# Patient Record
Sex: FEMALE | Employment: OTHER | URBAN - NONMETROPOLITAN AREA
[De-identification: names, ages, dates, MRNs, and addresses within clinical notes are randomized per-mention and may not be internally consistent; named-entity substitution may affect disease eponyms.]

---

## 2021-04-06 ENCOUNTER — DOCTOR'S OFFICE (OUTPATIENT)
Dept: URBAN - NONMETROPOLITAN AREA CLINIC 1 | Facility: CLINIC | Age: 56
Setting detail: OPHTHALMOLOGY
End: 2021-04-06
Payer: COMMERCIAL

## 2021-04-06 DIAGNOSIS — H52.4: ICD-10-CM

## 2021-04-06 DIAGNOSIS — H52.03: ICD-10-CM

## 2021-04-06 PROCEDURE — 92310 CONTACT LENS FITTING OU: CPT | Performed by: OPTOMETRIST

## 2021-04-06 PROCEDURE — 92015 DETERMINE REFRACTIVE STATE: CPT | Performed by: OPTOMETRIST

## 2021-04-06 PROCEDURE — 92004 COMPRE OPH EXAM NEW PT 1/>: CPT | Performed by: OPTOMETRIST

## 2021-04-06 ASSESSMENT — REFRACTION_MANIFEST
OD_CYLINDER: -0.25
OS_SPHERE: +5.00
OS_VA1: 20/20-2
OD_SPHERE: +3.00
OD_VA1: 20/20-2
OS_AXIS: 060
OS_VA2: 20/20-2
OS_CYLINDER: -0.75
OD_ADD: +2.50
OS_ADD: +2.50
OD_VA2: 20/20-2
OD_AXIS: 090

## 2021-04-06 ASSESSMENT — REFRACTION_AUTOREFRACTION
OD_AXIS: 170
OS_SPHERE: +5.25
OS_CYLINDER: -0.75
OD_CYLINDER: -0.25
OD_SPHERE: +3.25
OS_AXIS: 54

## 2021-04-06 ASSESSMENT — VISUAL ACUITY
OD_BCVA: 20/20-2
OS_BCVA: 20/20-2

## 2021-04-06 ASSESSMENT — REFRACTION_CURRENTRX
OS_ADD: +2.00
OD_SPHERE: +2.50
OD_CYLINDER: -0.50
OS_OVR_VA: 20/
OD_OVR_VA: 20/
OD_ADD: +2.00
OS_CYLINDER: -1.00
OD_AXIS: 87
OS_AXIS: 75
OS_SPHERE: +5.00

## 2021-04-06 ASSESSMENT — CONFRONTATIONAL VISUAL FIELD TEST (CVF)
OS_FINDINGS: FULL
OD_FINDINGS: FULL

## 2021-04-06 ASSESSMENT — SPHEQUIV_DERIVED
OS_SPHEQUIV: 4.625
OD_SPHEQUIV: 2.875
OD_SPHEQUIV: 3.125
OS_SPHEQUIV: 4.875

## 2021-04-06 ASSESSMENT — TONOMETRY
OS_IOP_MMHG: 12
OD_IOP_MMHG: 12

## 2021-05-14 ENCOUNTER — DOCTOR'S OFFICE (OUTPATIENT)
Dept: URBAN - NONMETROPOLITAN AREA CLINIC 1 | Facility: CLINIC | Age: 56
Setting detail: OPHTHALMOLOGY
End: 2021-05-14
Payer: MEDICARE

## 2021-05-14 DIAGNOSIS — D23.112: ICD-10-CM

## 2021-05-14 PROCEDURE — 92285 EXTERNAL OCULAR PHOTOGRAPHY: CPT | Performed by: OPHTHALMOLOGY

## 2021-05-14 ASSESSMENT — CONFRONTATIONAL VISUAL FIELD TEST (CVF)
OD_FINDINGS: FULL
OS_FINDINGS: FULL

## 2021-05-17 ASSESSMENT — REFRACTION_MANIFEST
OD_VA1: 20/20-2
OS_SPHERE: +5.00
OS_VA2: 20/20-2
OS_CYLINDER: -0.75
OS_AXIS: 060
OD_CYLINDER: -0.25
OD_SPHERE: +3.00
OD_VA2: 20/20-2
OD_ADD: +2.50
OS_ADD: +2.50
OS_VA1: 20/20-2
OD_AXIS: 090

## 2021-05-17 ASSESSMENT — REFRACTION_AUTOREFRACTION
OS_SPHERE: +5.25
OD_SPHERE: +3.25
OS_AXIS: 54
OD_CYLINDER: -0.25
OD_AXIS: 170
OS_CYLINDER: -0.75

## 2021-05-17 ASSESSMENT — REFRACTION_CURRENTRX
OD_ADD: +2.00
OD_OVR_VA: 20/
OS_OVR_VA: 20/
OS_AXIS: 75
OD_SPHERE: +2.50
OS_CYLINDER: -1.00
OD_AXIS: 87
OD_CYLINDER: -0.50
OS_ADD: +2.00
OS_SPHERE: +5.00

## 2021-05-17 ASSESSMENT — VISUAL ACUITY
OD_BCVA: 20/20-2
OS_BCVA: 20/20-2

## 2021-05-17 ASSESSMENT — SPHEQUIV_DERIVED
OS_SPHEQUIV: 4.625
OS_SPHEQUIV: 4.875
OD_SPHEQUIV: 2.875
OD_SPHEQUIV: 3.125

## 2021-06-30 ENCOUNTER — DOCTOR'S OFFICE (OUTPATIENT)
Dept: URBAN - NONMETROPOLITAN AREA CLINIC 1 | Facility: CLINIC | Age: 56
Setting detail: OPHTHALMOLOGY
End: 2021-06-30

## 2021-06-30 DIAGNOSIS — H52.03: ICD-10-CM

## 2021-06-30 PROCEDURE — CLFUP CONTACT LENS FOLLOW-UP: Performed by: OPTOMETRIST

## 2021-06-30 ASSESSMENT — REFRACTION_MANIFEST
OS_CYLINDER: -0.75
OD_VA2: 20/20-2
OS_ADD: +2.50
OD_VA1: 20/20-2
OS_VA2: 20/20-2
OS_AXIS: 060
OS_SPHERE: +5.00
OD_ADD: +2.50
OD_AXIS: 090
OD_CYLINDER: -0.25
OD_SPHERE: +3.00
OS_VA1: 20/20-2

## 2021-06-30 ASSESSMENT — CONFRONTATIONAL VISUAL FIELD TEST (CVF)
OS_FINDINGS: FULL
OD_FINDINGS: FULL

## 2021-06-30 ASSESSMENT — REFRACTION_CURRENTRX
OS_AXIS: 75
OD_ADD: +2.00
OS_CYLINDER: -1.00
OD_AXIS: 87
OS_SPHERE: +5.00
OS_ADD: +2.00
OD_CYLINDER: -0.50
OS_OVR_VA: 20/
OD_OVR_VA: 20/
OD_SPHERE: +2.50

## 2021-06-30 ASSESSMENT — VISUAL ACUITY
OD_BCVA: 20/30-2
OS_BCVA: 20/25-1

## 2021-06-30 ASSESSMENT — SPHEQUIV_DERIVED
OD_SPHEQUIV: 2.875
OD_SPHEQUIV: 3.125
OS_SPHEQUIV: 4.875
OS_SPHEQUIV: 4.625

## 2021-06-30 ASSESSMENT — REFRACTION_AUTOREFRACTION
OS_SPHERE: +5.25
OS_AXIS: 54
OS_CYLINDER: -0.75
OD_CYLINDER: -0.25
OD_SPHERE: +3.25
OD_AXIS: 170

## 2021-08-17 ENCOUNTER — AMBUL SURGICAL CARE (OUTPATIENT)
Dept: URBAN - NONMETROPOLITAN AREA SURGERY 1 | Facility: SURGERY | Age: 56
Setting detail: OPHTHALMOLOGY
End: 2021-08-17
Payer: MEDICARE

## 2021-08-17 DIAGNOSIS — D23.112: ICD-10-CM

## 2021-08-17 PROCEDURE — G8907 PT DOC NO EVENTS ON DISCHARG: HCPCS | Performed by: OPHTHALMOLOGY

## 2021-08-17 PROCEDURE — G8918 PT W/O PREOP ORDER IV AB PRO: HCPCS | Performed by: OPHTHALMOLOGY

## 2021-08-17 PROCEDURE — 67810 INCAL BX EYELID SKN LID MRGN: CPT | Performed by: OPHTHALMOLOGY

## 2021-08-17 PROCEDURE — 67840 REMOVE EYELID LESION: CPT | Performed by: OPHTHALMOLOGY

## 2021-08-27 ENCOUNTER — DOCTOR'S OFFICE (OUTPATIENT)
Dept: URBAN - NONMETROPOLITAN AREA CLINIC 1 | Facility: CLINIC | Age: 56
Setting detail: OPHTHALMOLOGY
End: 2021-08-27
Payer: MEDICARE

## 2021-08-27 ENCOUNTER — RX ONLY (RX ONLY)
Age: 56
End: 2021-08-27

## 2021-08-27 DIAGNOSIS — D23.112: ICD-10-CM

## 2021-08-27 PROBLEM — H40.033 ANATOMICAL NARROW ANGLE; BOTH EYES: Status: ACTIVE | Noted: 2021-04-06

## 2021-08-27 PROBLEM — H25.013 CORTICAL CATARACT; BOTH EYES: Status: ACTIVE | Noted: 2021-04-06

## 2021-08-27 PROBLEM — H52.03 HYPEROPIA; BOTH EYES: Status: ACTIVE | Noted: 2021-04-06

## 2021-08-27 PROCEDURE — 92012 INTRM OPH EXAM EST PATIENT: CPT | Performed by: OPHTHALMOLOGY

## 2021-08-27 ASSESSMENT — SPHEQUIV_DERIVED
OS_SPHEQUIV: 4.875
OD_SPHEQUIV: 2.875
OD_SPHEQUIV: 3.125
OS_SPHEQUIV: 4.625

## 2021-08-27 ASSESSMENT — REFRACTION_MANIFEST
OD_SPHERE: +3.00
OS_VA2: 20/20-2
OD_CYLINDER: -0.25
OD_AXIS: 090
OD_VA2: 20/20-2
OS_SPHERE: +5.00
OS_CYLINDER: -0.75
OS_AXIS: 060
OS_VA1: 20/20-2
OD_VA1: 20/20-2
OD_ADD: +2.50
OS_ADD: +2.50

## 2021-08-27 ASSESSMENT — REFRACTION_AUTOREFRACTION
OD_SPHERE: +3.25
OS_SPHERE: +5.25
OS_CYLINDER: -0.75
OD_AXIS: 170
OS_AXIS: 54
OD_CYLINDER: -0.25

## 2021-08-27 ASSESSMENT — REFRACTION_CURRENTRX
OD_ADD: +2.00
OD_CYLINDER: -0.50
OS_CYLINDER: -1.00
OS_ADD: +2.00
OD_AXIS: 87
OS_OVR_VA: 20/
OS_SPHERE: +5.00
OS_AXIS: 75
OD_OVR_VA: 20/
OD_SPHERE: +2.50

## 2021-08-27 ASSESSMENT — CONFRONTATIONAL VISUAL FIELD TEST (CVF)
OS_FINDINGS: FULL
OD_FINDINGS: FULL

## 2021-08-27 ASSESSMENT — VISUAL ACUITY
OD_BCVA: 20/30-2
OS_BCVA: 20/25-2

## 2023-02-24 ENCOUNTER — OFFICE VISIT (OUTPATIENT)
Dept: URGENT CARE | Facility: CLINIC | Age: 58
End: 2023-02-24

## 2023-02-24 VITALS
DIASTOLIC BLOOD PRESSURE: 76 MMHG | SYSTOLIC BLOOD PRESSURE: 130 MMHG | HEIGHT: 62 IN | WEIGHT: 165 LBS | OXYGEN SATURATION: 97 % | HEART RATE: 90 BPM | RESPIRATION RATE: 18 BRPM | TEMPERATURE: 96.8 F | BODY MASS INDEX: 30.36 KG/M2

## 2023-02-24 DIAGNOSIS — J01.00 ACUTE NON-RECURRENT MAXILLARY SINUSITIS: Primary | ICD-10-CM

## 2023-02-24 LAB — S PYO AG THROAT QL: NEGATIVE

## 2023-02-24 RX ORDER — ATORVASTATIN CALCIUM 40 MG/1
40 TABLET, FILM COATED ORAL DAILY
COMMUNITY

## 2023-02-24 RX ORDER — BENZONATATE 100 MG/1
100 CAPSULE ORAL 3 TIMES DAILY PRN
Qty: 20 CAPSULE | Refills: 0 | Status: SHIPPED | OUTPATIENT
Start: 2023-02-24

## 2023-02-24 RX ORDER — PANTOPRAZOLE SODIUM 40 MG/1
40 TABLET, DELAYED RELEASE ORAL
COMMUNITY

## 2023-02-24 RX ORDER — RALTEGRAVIR 400 MG/1
400 TABLET, FILM COATED ORAL 2 TIMES DAILY
COMMUNITY
Start: 2023-01-30

## 2023-02-24 RX ORDER — PREGABALIN 75 MG/1
75 CAPSULE ORAL 2 TIMES DAILY
COMMUNITY

## 2023-02-24 RX ORDER — TRAZODONE HYDROCHLORIDE 100 MG/1
100 TABLET ORAL
COMMUNITY
Start: 2022-12-28

## 2023-02-24 RX ORDER — AMOXICILLIN 500 MG/1
500 TABLET, FILM COATED ORAL 2 TIMES DAILY
Qty: 14 TABLET | Refills: 0 | Status: SHIPPED | OUTPATIENT
Start: 2023-02-24 | End: 2023-03-03

## 2023-02-24 RX ORDER — EMTRICITABINE AND TENOFOVIR ALAFENAMIDE 200; 25 MG/1; MG/1
1 TABLET ORAL DAILY
COMMUNITY
Start: 2023-01-30

## 2023-02-24 NOTE — PROGRESS NOTES
[facility]        NAME: Renetta Coronado is a 61 y o  female  : 1965    MRN: [id number]  DATE: 2023  TIME: 5:20 PM    Assessment and Plan   Acute non-recurrent maxillary sinusitis [J01 00]  1  Acute non-recurrent maxillary sinusitis  POCT rapid strepA    amoxicillin (AMOXIL) 500 MG tablet    benzonatate (TESSALON PERLES) 100 mg capsule        Discussed problem with patient  Rapid strep performed today was negative but prescribing amoxicillin for sinusitis complaints as well as Tessalon Perles for cough symptoms  Advised continue conservative management by using over-the-counter cold and flu medication and advised to keep pushing fluids  Follow-up with PCP if symptoms do not improve and report to the ER symptoms worsen  Patient Instructions       Follow up with PCP in 3-5 days  Proceed to  ER if symptoms worsen  Chief Complaint     Chief Complaint   Patient presents with   • Cold Like Symptoms     C/o nasal congestion, sore throat, onset 10 days ago  History of Present Illness       71-year-old female presents with 10 days of sinus pressure, congestion, cough  Symptoms began gradually and she has been using over-the-counter cold and flu medication which is helped somewhat but after the medication wears off her symptoms returned  Appetite is decreased but has been pushing fluids  Denies any fevers or chills  Cough is nonproductive but has been keeping her up at night and it is worse in the morning  Also been having headaches but she relates due to sinus pressure  Denies any shortness of breath, chest pain, abdominal complaints  Review of Systems   Review of Systems   Constitutional: Positive for appetite change  Negative for chills, fatigue and fever  HENT: Positive for congestion, postnasal drip, rhinorrhea, sinus pressure and sore throat  Negative for ear pain and sinus pain  Respiratory: Positive for cough   Negative for shortness of breath, wheezing and stridor  Cardiovascular: Negative for chest pain and palpitations  Gastrointestinal: Negative for abdominal pain, constipation, diarrhea, nausea and vomiting  Musculoskeletal: Negative for myalgias  Neurological: Positive for headaches  Negative for dizziness, syncope and light-headedness  Current Medications       Current Outpatient Medications:   •  amoxicillin (AMOXIL) 500 MG tablet, Take 1 tablet (500 mg total) by mouth 2 (two) times a day for 7 days, Disp: 14 tablet, Rfl: 0  •  atorvastatin (LIPITOR) 40 mg tablet, Take 40 mg by mouth in the morning, Disp: , Rfl:   •  benzonatate (TESSALON PERLES) 100 mg capsule, Take 1 capsule (100 mg total) by mouth 3 (three) times a day as needed for cough, Disp: 20 capsule, Rfl: 0  •  Descovy 200-25 MG tablet, Take 1 tablet by mouth daily, Disp: , Rfl:   •  Isentress 400 MG tablet, Take 400 mg by mouth 2 (two) times a day, Disp: , Rfl:   •  pantoprazole (PROTONIX) 40 mg tablet, Take 40 mg by mouth daily at bedtime, Disp: , Rfl:   •  pregabalin (LYRICA) 75 mg capsule, Take 75 mg by mouth 2 (two) times a day, Disp: , Rfl:   •  traZODone (DESYREL) 100 mg tablet, Take 100 mg by mouth daily at bedtime, Disp: , Rfl:     Current Allergies     Allergies as of 02/24/2023   • (No Known Allergies)            The following portions of the patient's history were reviewed and updated as appropriate: allergies, current medications, past family history, past medical history, past social history, past surgical history and problem list      Past Medical History:   Diagnosis Date   • GERD (gastroesophageal reflux disease)    • High cholesterol    • HIV carrier [facility])        Past Surgical History:   Procedure Laterality Date   • HYSTERECTOMY     • TUBAL LIGATION         Family History   Problem Relation Age of Onset   • Heart disease Mother    • Hypertension Father          Medications have been verified          Objective   /76   Pulse 90   Temp (!) 96 8 °F (36 °C) Resp 18   Ht 5' 2" (1 575 m)   Wt 74 8 kg (165 lb)   SpO2 97%   BMI 30 18 kg/m²        Physical Exam     Physical Exam  Vitals and nursing note reviewed  Constitutional:       General: She is not in acute distress  Appearance: Normal appearance  She is normal weight  She is not ill-appearing, toxic-appearing or diaphoretic  HENT:      Head: Normocephalic  Right Ear: Tympanic membrane, ear canal and external ear normal  There is no impacted cerumen  Left Ear: Tympanic membrane, ear canal and external ear normal  There is no impacted cerumen  Nose: Congestion and rhinorrhea present  Mouth/Throat:      Mouth: Mucous membranes are moist       Pharynx: Oropharynx is clear  Posterior oropharyngeal erythema present  No oropharyngeal exudate  Eyes:      General:         Right eye: No discharge  Left eye: No discharge  Extraocular Movements: Extraocular movements intact  Conjunctiva/sclera: Conjunctivae normal       Pupils: Pupils are equal, round, and reactive to light  Neck:      Vascular: No carotid bruit  Cardiovascular:      Rate and Rhythm: Normal rate and regular rhythm  Pulses: Normal pulses  Heart sounds: Normal heart sounds  No murmur heard  No friction rub  No gallop  Pulmonary:      Effort: Pulmonary effort is normal  No respiratory distress  Breath sounds: Normal breath sounds  No stridor  No wheezing, rhonchi or rales  Chest:      Chest wall: No tenderness  Musculoskeletal:         General: No swelling, tenderness or signs of injury  Normal range of motion  Cervical back: Normal range of motion and neck supple  No rigidity or tenderness  Lymphadenopathy:      Cervical: No cervical adenopathy  Skin:     General: Skin is warm and dry  Capillary Refill: Capillary refill takes less than 2 seconds  Coloration: Skin is not jaundiced or pale  Findings: No erythema     Neurological:      General: No focal deficit present  Mental Status: She is alert and oriented to person, place, and time     Psychiatric:         Mood and Affect: Mood normal          Behavior: Behavior normal

## 2023-05-09 ENCOUNTER — DOCTOR'S OFFICE (OUTPATIENT)
Dept: URBAN - NONMETROPOLITAN AREA CLINIC 1 | Facility: CLINIC | Age: 58
Setting detail: OPHTHALMOLOGY
End: 2023-05-09
Payer: COMMERCIAL

## 2023-05-09 ENCOUNTER — OPTICAL OFFICE (OUTPATIENT)
Dept: URBAN - NONMETROPOLITAN AREA CLINIC 4 | Facility: CLINIC | Age: 58
Setting detail: OPHTHALMOLOGY
End: 2023-05-09
Payer: COMMERCIAL

## 2023-05-09 DIAGNOSIS — H52.4: ICD-10-CM

## 2023-05-09 PROBLEM — H25.13 CATARACT NUCLEAR SCLEROSIS; BOTH EYES: Status: ACTIVE | Noted: 2023-05-09

## 2023-05-09 PROCEDURE — 92014 COMPRE OPH EXAM EST PT 1/>: CPT | Performed by: OPTOMETRIST

## 2023-05-09 PROCEDURE — V2744 TINT PHOTOCHROMATIC LENS/ES: HCPCS | Performed by: OPTOMETRIST

## 2023-05-09 PROCEDURE — V2781 PROGRESSIVE LENS PER LENS: HCPCS | Performed by: OPTOMETRIST

## 2023-05-09 PROCEDURE — V2784 LENS POLYCARB OR EQUAL: HCPCS | Performed by: OPTOMETRIST

## 2023-05-09 PROCEDURE — V2750 ANTI-REFLECTIVE COATING: HCPCS | Performed by: OPTOMETRIST

## 2023-05-09 PROCEDURE — V2020 VISION SVCS FRAMES PURCHASES: HCPCS | Performed by: OPTOMETRIST

## 2023-05-09 ASSESSMENT — VISUAL ACUITY
OS_BCVA: 20/20-2
OD_BCVA: 20/20-1

## 2023-05-09 ASSESSMENT — REFRACTION_MANIFEST
OS_ADD: +2.50
OD_VA1: 20/20-2
OD_VA2: 20/20-2
OD_AXIS: 090
OS_VA2: 20/20-2
OD_SPHERE: +3.00
OS_SPHERE: +5.00
OS_VA1: 20/20-2
OS_AXIS: 060
OD_ADD: +2.50
OD_CYLINDER: -0.25
OS_CYLINDER: -0.75

## 2023-05-09 ASSESSMENT — REFRACTION_CURRENTRX
OS_OVR_VA: 20/
OS_AXIS: 084
OD_AXIS: 089
OD_CYLINDER: -0.50
OS_CYLINDER: -1.00
OS_VPRISM_DIRECTION: BF
OS_ADD: +2.25
OD_VPRISM_DIRECTION: BF
OS_SPHERE: +5.00
OD_ADD: +2.25
OD_SPHERE: +2.50
OD_OVR_VA: 20/

## 2023-05-09 ASSESSMENT — SPHEQUIV_DERIVED
OS_SPHEQUIV: 4.75
OD_SPHEQUIV: 2.875
OS_SPHEQUIV: 4.625
OD_SPHEQUIV: 2.875

## 2023-05-09 ASSESSMENT — REFRACTION_AUTOREFRACTION
OD_SPHERE: +3.00
OD_AXIS: 108
OD_CYLINDER: -0.25
OS_SPHERE: +5.25
OS_CYLINDER: -1.00
OS_AXIS: 076

## 2023-05-09 ASSESSMENT — TONOMETRY
OS_IOP_MMHG: 16
OD_IOP_MMHG: 16

## 2023-05-09 ASSESSMENT — CONFRONTATIONAL VISUAL FIELD TEST (CVF)
OS_FINDINGS: FULL
OD_FINDINGS: FULL

## 2023-05-25 ENCOUNTER — OFFICE VISIT (OUTPATIENT)
Dept: URGENT CARE | Facility: CLINIC | Age: 58
End: 2023-05-25

## 2023-05-25 VITALS
DIASTOLIC BLOOD PRESSURE: 78 MMHG | HEIGHT: 62 IN | HEART RATE: 81 BPM | WEIGHT: 167 LBS | RESPIRATION RATE: 18 BRPM | TEMPERATURE: 97.7 F | OXYGEN SATURATION: 96 % | SYSTOLIC BLOOD PRESSURE: 134 MMHG | BODY MASS INDEX: 30.73 KG/M2

## 2023-05-25 DIAGNOSIS — J20.9 ACUTE BRONCHITIS, UNSPECIFIED ORGANISM: Primary | ICD-10-CM

## 2023-05-25 RX ORDER — BENZONATATE 100 MG/1
100 CAPSULE ORAL 3 TIMES DAILY PRN
Qty: 20 CAPSULE | Refills: 0 | Status: SHIPPED | OUTPATIENT
Start: 2023-05-25

## 2023-05-25 RX ORDER — AZITHROMYCIN 250 MG/1
TABLET, FILM COATED ORAL
Qty: 6 TABLET | Refills: 0 | Status: SHIPPED | OUTPATIENT
Start: 2023-05-25 | End: 2023-05-29

## 2023-05-25 NOTE — PATIENT INSTRUCTIONS
Take antibiotics as prescribed  Take Tessalon as needed for cough  Can use OTC Mucinex/sudafed as needed  Tylenol/ibuprofen for pain or fever   Honey for cough  Cool mist humidifier or steamy shower using safe precautions to prevent burns   Follow up with PCP in 3-5 days  Proceed to  ER if symptoms worsen

## 2023-05-25 NOTE — PROGRESS NOTES
[facility]        NAME: Mariya Mccray is a 60 y o  female  : 1965    MRN: [id number]  DATE: May 25, 2023  TIME: 7:16 PM    Assessment and Plan   Acute bronchitis, unspecified organism [J20 9]  1  Acute bronchitis, unspecified organism  benzonatate (TESSALON PERLES) 100 mg capsule    azithromycin (ZITHROMAX) 250 mg tablet            Patient Instructions     Take antibiotics as prescribed  Take Tessalon as needed for cough  Can use OTC Mucinex/sudafed as needed  Tylenol/ibuprofen for pain or fever   Honey for cough  Cool mist humidifier or steamy shower using safe precautions to prevent burns   Follow up with PCP in 3-5 days  Proceed to  ER if symptoms worsen  Chief Complaint     Chief Complaint   Patient presents with   • Cough     C/o productive cough with clear mucus, Onset of symptoms x7 days  Pt states she has tried otc medication, vicks and a humidifier but cough is progressively getting worse  History of Present Illness       Cough  This is a new problem  Episode onset: 7 days  The problem has been gradually worsening  The cough is productive of sputum  Associated symptoms include wheezing  Pertinent negatives include no chest pain, chills, ear pain, fever, postnasal drip, rhinorrhea, sore throat or shortness of breath  Associated symptoms comments: Has metal taste in mouth  Treatments tried: OTC medications, Vicks, humidifier  The treatment provided no relief  There is no history of asthma  Review of Systems   Review of Systems   Constitutional: Negative for chills, diaphoresis, fatigue and fever  HENT: Negative for congestion, ear pain, postnasal drip, rhinorrhea, sinus pressure, sore throat and trouble swallowing  Respiratory: Positive for cough and wheezing  Negative for chest tightness and shortness of breath  Cardiovascular: Negative for chest pain and palpitations  Skin: Negative for color change  Neurological: Negative for dizziness and light-headedness  "  Psychiatric/Behavioral: Negative for sleep disturbance  Current Medications       Current Outpatient Medications:   •  atorvastatin (LIPITOR) 40 mg tablet, Take 40 mg by mouth in the morning, Disp: , Rfl:   •  azithromycin (ZITHROMAX) 250 mg tablet, Take 2 tablets today then 1 tablet daily x 4 days, Disp: 6 tablet, Rfl: 0  •  benzonatate (TESSALON PERLES) 100 mg capsule, Take 1 capsule (100 mg total) by mouth 3 (three) times a day as needed for cough, Disp: 20 capsule, Rfl: 0  •  Descovy 200-25 MG tablet, Take 1 tablet by mouth daily, Disp: , Rfl:   •  Isentress 400 MG tablet, Take 400 mg by mouth 2 (two) times a day, Disp: , Rfl:   •  pantoprazole (PROTONIX) 40 mg tablet, Take 40 mg by mouth daily at bedtime, Disp: , Rfl:   •  pregabalin (LYRICA) 75 mg capsule, Take 75 mg by mouth 2 (two) times a day, Disp: , Rfl:   •  traZODone (DESYREL) 100 mg tablet, Take 100 mg by mouth daily at bedtime, Disp: , Rfl:     Current Allergies     Allergies as of 05/25/2023   • (No Known Allergies)            The following portions of the patient's history were reviewed and updated as appropriate: allergies, current medications, past family history, past medical history, past social history, past surgical history and problem list      Past Medical History:   Diagnosis Date   • GERD (gastroesophageal reflux disease)    • High cholesterol    • HIV carrier [facility])        Past Surgical History:   Procedure Laterality Date   • HYSTERECTOMY     • TUBAL LIGATION         Family History   Problem Relation Age of Onset   • Heart disease Mother    • Hypertension Father          Medications have been verified  Objective   /78   Pulse 81   Temp 97 7 °F (36 5 °C)   Resp 18   Ht 5' 2\" (1 575 m)   Wt 75 8 kg (167 lb)   SpO2 96%   BMI 30 54 kg/m²        Physical Exam     Physical Exam  Constitutional:       General: She is not in acute distress  Appearance: Normal appearance  She is not ill-appearing     HENT:      " Head: Normocephalic  Right Ear: Tympanic membrane and external ear normal       Left Ear: Tympanic membrane and external ear normal       Nose: No congestion  Mouth/Throat:      Mouth: Mucous membranes are moist       Pharynx: Oropharynx is clear  No oropharyngeal exudate or posterior oropharyngeal erythema  Cardiovascular:      Rate and Rhythm: Normal rate and regular rhythm  Pulses: Normal pulses  Heart sounds: Normal heart sounds  Pulmonary:      Effort: Pulmonary effort is normal  No respiratory distress  Breath sounds: Normal breath sounds  No stridor  No wheezing, rhonchi or rales  Lymphadenopathy:      Cervical: No cervical adenopathy  Skin:     General: Skin is warm and dry  Neurological:      General: No focal deficit present  Mental Status: She is alert and oriented to person, place, and time  Mental status is at baseline  Psychiatric:         Mood and Affect: Mood normal          Behavior: Behavior normal          Thought Content:  Thought content normal          Judgment: Judgment normal

## 2024-03-30 ENCOUNTER — OFFICE VISIT (OUTPATIENT)
Dept: URGENT CARE | Facility: CLINIC | Age: 59
End: 2024-03-30
Payer: COMMERCIAL

## 2024-03-30 VITALS
BODY MASS INDEX: 30.12 KG/M2 | HEIGHT: 63 IN | WEIGHT: 170 LBS | HEART RATE: 98 BPM | RESPIRATION RATE: 20 BRPM | OXYGEN SATURATION: 97 % | DIASTOLIC BLOOD PRESSURE: 70 MMHG | SYSTOLIC BLOOD PRESSURE: 110 MMHG | TEMPERATURE: 97.8 F

## 2024-03-30 DIAGNOSIS — J01.10 ACUTE NON-RECURRENT FRONTAL SINUSITIS: Primary | ICD-10-CM

## 2024-03-30 PROCEDURE — 99213 OFFICE O/P EST LOW 20 MIN: CPT

## 2024-03-30 RX ORDER — FLUTICASONE PROPIONATE 50 MCG
1 SPRAY, SUSPENSION (ML) NASAL DAILY
Qty: 9.9 ML | Refills: 0 | Status: SHIPPED | OUTPATIENT
Start: 2024-03-30

## 2024-03-30 RX ORDER — AMOXICILLIN AND CLAVULANATE POTASSIUM 875; 125 MG/1; MG/1
1 TABLET, FILM COATED ORAL EVERY 12 HOURS SCHEDULED
Qty: 14 TABLET | Refills: 0 | Status: SHIPPED | OUTPATIENT
Start: 2024-03-30 | End: 2024-04-06

## 2024-03-30 NOTE — PROGRESS NOTES
[facility]        NAME: Margaret Shepard is a 58 y.o. female  : 1965    MRN: [id number]  DATE: 2024  TIME: 3:10 PM    Assessment and Plan   Acute non-recurrent frontal sinusitis [J01.10]  1. Acute non-recurrent frontal sinusitis  amoxicillin-clavulanate (AUGMENTIN) 875-125 mg per tablet    fluticasone (FLONASE) 50 mcg/act nasal spray        Patient Instructions     Antibiotics given for sinusitis. Recommend OTC decongestants.  Follow up with PCP in 3-5 days if no improvement. Proceed to ER if symptoms worsen.    Chief Complaint     Chief Complaint   Patient presents with    Cold Like Symptoms     C/o nasal congestion,, body aches, productive cough of brown mucus, intermittent headache. Onset 2 weeks. Home Covid test negative yesterday       History of Present Illness     Margaret Shepard is a 58 y.o. female presenting to the office today for facial pain and congestion.  Symptoms have been present for 14 days, and include postnasal drip, congestion, and brown mucus.   She has tried Nyquil, Dayquil, tea for her symptoms, no relief.  Sick contacts include: none  Recent travel: none    Review of Systems     Review of Systems   Constitutional:  Negative for chills and fever.   HENT:  Positive for congestion, sinus pressure and sore throat. Negative for ear pain and trouble swallowing.    Respiratory:  Positive for cough (dry). Negative for shortness of breath, wheezing and stridor.    Gastrointestinal:  Negative for abdominal pain, nausea and vomiting.   Genitourinary: Negative.    Musculoskeletal:  Positive for myalgias.   Neurological:  Positive for headaches.       Current Medications       Current Outpatient Medications:     amoxicillin-clavulanate (AUGMENTIN) 875-125 mg per tablet, Take 1 tablet by mouth every 12 (twelve) hours for 7 days, Disp: 14 tablet, Rfl: 0    atorvastatin (LIPITOR) 40 mg tablet, Take 40 mg by mouth in the morning, Disp: , Rfl:     Descovy 200-25 MG tablet, Take 1 tablet by  "mouth daily, Disp: , Rfl:     fluticasone (FLONASE) 50 mcg/act nasal spray, 1 spray into each nostril daily, Disp: 9.9 mL, Rfl: 0    Isentress 400 MG tablet, Take 400 mg by mouth 2 (two) times a day, Disp: , Rfl:     pantoprazole (PROTONIX) 40 mg tablet, Take 40 mg by mouth daily at bedtime, Disp: , Rfl:     pregabalin (LYRICA) 75 mg capsule, Take 75 mg by mouth 2 (two) times a day, Disp: , Rfl:     traZODone (DESYREL) 100 mg tablet, Take 100 mg by mouth daily at bedtime, Disp: , Rfl:     benzonatate (TESSALON PERLES) 100 mg capsule, Take 1 capsule (100 mg total) by mouth 3 (three) times a day as needed for cough (Patient not taking: Reported on 3/30/2024), Disp: 20 capsule, Rfl: 0    Current Allergies     Allergies as of 03/30/2024    (No Known Allergies)            The following portions of the patient's history were reviewed and updated as appropriate: allergies, current medications, past family history, past medical history, past social history, past surgical history and problem list.     Past Medical History:   Diagnosis Date    GERD (gastroesophageal reflux disease)     High cholesterol     HIV carrier (HCC)        Past Surgical History:   Procedure Laterality Date    HYSTERECTOMY      TUBAL LIGATION         Family History   Problem Relation Age of Onset    Heart disease Mother     Hypertension Father        Medications have been verified.    Objective     /70   Pulse 98   Temp 97.8 °F (36.6 °C)   Resp 20   Ht 5' 3\" (1.6 m)   Wt 77.1 kg (170 lb)   SpO2 97%   BMI 30.11 kg/m²   No LMP recorded. Patient is postmenopausal.     Physical Exam     Physical Exam  Vitals and nursing note reviewed.   Constitutional:       General: She is not in acute distress.     Appearance: Normal appearance. She is normal weight. She is not ill-appearing, toxic-appearing or diaphoretic.   HENT:      Head: Normocephalic and atraumatic.      Right Ear: Tympanic membrane, ear canal and external ear normal. There is no " impacted cerumen.      Left Ear: Tympanic membrane, ear canal and external ear normal. There is no impacted cerumen.      Nose: Congestion and rhinorrhea present.      Right Sinus: Frontal sinus tenderness present. No maxillary sinus tenderness.      Left Sinus: Frontal sinus tenderness present. No maxillary sinus tenderness.      Mouth/Throat:      Mouth: Mucous membranes are moist.      Pharynx: Posterior oropharyngeal erythema present. No oropharyngeal exudate.   Eyes:      General: No scleral icterus.        Right eye: No discharge.         Left eye: No discharge.      Conjunctiva/sclera: Conjunctivae normal.   Cardiovascular:      Rate and Rhythm: Normal rate and regular rhythm.      Pulses: Normal pulses.      Heart sounds: Normal heart sounds. No murmur heard.     No friction rub. No gallop.   Pulmonary:      Effort: Pulmonary effort is normal. No respiratory distress.      Breath sounds: Normal breath sounds. No stridor. No wheezing, rhonchi or rales.   Chest:      Chest wall: No tenderness.   Musculoskeletal:         General: Normal range of motion.      Cervical back: Normal range of motion and neck supple. No rigidity or tenderness.   Lymphadenopathy:      Cervical: No cervical adenopathy.   Skin:     General: Skin is warm and dry.      Capillary Refill: Capillary refill takes less than 2 seconds.      Coloration: Skin is not jaundiced.      Findings: No bruising or rash.   Neurological:      General: No focal deficit present.      Mental Status: She is alert. Mental status is at baseline.   Psychiatric:         Mood and Affect: Mood normal.         Behavior: Behavior normal.

## 2024-03-30 NOTE — PROGRESS NOTES
[facility]        NAME: Margaret Shepard is a 58 y.o. female  : 1965    MRN: [id number]  DATE: 2024  TIME: 3:03 PM    Assessment and Plan   No primary diagnosis found.  No diagnosis found.      Patient Instructions     Decongestants given for congestion. No signs of bacterial infection today. Follow up with PCP in 3-5 days if no improvement. Proceed to ER if symptoms worsen.    Chief Complaint     Chief Complaint   Patient presents with    Cold Like Symptoms     C/o nasal congestion,, body aches, productive cough of brown mucus, intermittent headache. Onset 2 weeks. Home Covid test negative yesterday         History of Present Illness     Margaret Shepard is a 58 y.o. female presenting to the office today for upper respiratory complaints.   Symptoms have been present for *** days, and include ***.   She has tried *** for her symptoms, *** relief.  Sick contacts include: ***      Review of Systems     Review of Systems    Current Medications       Current Outpatient Medications:     atorvastatin (LIPITOR) 40 mg tablet, Take 40 mg by mouth in the morning, Disp: , Rfl:     Descovy 200-25 MG tablet, Take 1 tablet by mouth daily, Disp: , Rfl:     Isentress 400 MG tablet, Take 400 mg by mouth 2 (two) times a day, Disp: , Rfl:     pantoprazole (PROTONIX) 40 mg tablet, Take 40 mg by mouth daily at bedtime, Disp: , Rfl:     pregabalin (LYRICA) 75 mg capsule, Take 75 mg by mouth 2 (two) times a day, Disp: , Rfl:     traZODone (DESYREL) 100 mg tablet, Take 100 mg by mouth daily at bedtime, Disp: , Rfl:     benzonatate (TESSALON PERLES) 100 mg capsule, Take 1 capsule (100 mg total) by mouth 3 (three) times a day as needed for cough (Patient not taking: Reported on 3/30/2024), Disp: 20 capsule, Rfl: 0    Current Allergies     Allergies as of 2024    (No Known Allergies)            The following portions of the patient's history were reviewed and updated as appropriate: allergies, current medications, past  "family history, past medical history, past social history, past surgical history and problem list.     Past Medical History:   Diagnosis Date    GERD (gastroesophageal reflux disease)     High cholesterol     HIV carrier (HCC)        Past Surgical History:   Procedure Laterality Date    HYSTERECTOMY      TUBAL LIGATION         Family History   Problem Relation Age of Onset    Heart disease Mother     Hypertension Father        Medications have been verified.    Objective     /70   Pulse 98   Temp 97.8 °F (36.6 °C)   Resp 20   Ht 5' 3\" (1.6 m)   Wt 77.1 kg (170 lb)   SpO2 97%   BMI 30.11 kg/m²   No LMP recorded. Patient is postmenopausal.     Physical Exam     Physical Exam              "

## 2024-07-09 ENCOUNTER — OFFICE VISIT (OUTPATIENT)
Dept: URGENT CARE | Facility: CLINIC | Age: 59
End: 2024-07-09
Payer: COMMERCIAL

## 2024-07-09 VITALS
HEIGHT: 62 IN | RESPIRATION RATE: 16 BRPM | HEART RATE: 100 BPM | TEMPERATURE: 97 F | DIASTOLIC BLOOD PRESSURE: 68 MMHG | BODY MASS INDEX: 32.02 KG/M2 | SYSTOLIC BLOOD PRESSURE: 124 MMHG | OXYGEN SATURATION: 98 % | WEIGHT: 174 LBS

## 2024-07-09 DIAGNOSIS — M54.50 ACUTE BILATERAL LOW BACK PAIN WITHOUT SCIATICA: Primary | ICD-10-CM

## 2024-07-09 PROCEDURE — 99213 OFFICE O/P EST LOW 20 MIN: CPT

## 2024-07-09 RX ORDER — BICTEGRAVIR SODIUM, EMTRICITABINE, AND TENOFOVIR ALAFENAMIDE FUMARATE 50; 200; 25 MG/1; MG/1; MG/1
TABLET ORAL
COMMUNITY
Start: 2024-06-25

## 2024-07-09 RX ORDER — CYCLOBENZAPRINE HCL 5 MG
5 TABLET ORAL 3 TIMES DAILY PRN
Qty: 10 TABLET | Refills: 0 | Status: SHIPPED | OUTPATIENT
Start: 2024-07-09

## 2024-07-09 NOTE — PATIENT INSTRUCTIONS
"  Take Flexeril as prescribed but do not drink alcohol, drive your vehicle, or operate heavy machinery  OTC Tylenol/Ibuprofen for pain  Heat/Ice  Topical analgesics  Gentle stretches and exercises   Follow up with PCP in 3-5 days.  Proceed to  ER if symptoms worsen.    If tests have been performed at [facility], our office will contact you with results if changes need to be made to the care plan discussed with you at the visit.  You can review your full results on [facility] MyChart.      Patient Education     Low back pain in adults   The Basics   Written by the doctors and editors at [facility]   How worried should I be about low back pain? -- Do not assume the worst. Almost everyone gets back pain at some point. Low back pain can be scary. But even when the pain is severe, it usually goes away on its own within a few weeks. The cases that require urgent care or surgery are rare.  See your doctor or nurse if you have back pain and you:   Recently had a fall or an injury to your back   Have numbness or weakness in your legs   Have problems with bladder or bowel control   Have unexplained weight loss   Have a fever or feel sick in other ways   Take steroid medicine, such as prednisone, on a regular basis   Have diabetes or a medical problem that weakens your immune system   Have a history of cancer or osteoporosis  You should also see a doctor if:   Your back pain is so severe that you cannot perform simple tasks   Your back pain does not start to improve within 4 weeks  What are the parts of the back? -- The back is made up of (figure 1):   Vertebrae - These are the bones of the spine. Each has a hole in the center. The vertebrae are stacked to form a hollow tube called the \"spinal canal.\" The spinal cord passes through this tube and is protected by the vertebrae.   Spinal cord and nerves - The spinal cord is the bundle of nerves that connects the brain to the rest of the body. It runs through the vertebrae. Nerves " "branch from the spinal cord and pass in between the vertebrae. From there, they connect to the arms, the legs, and the organs.   Discs - Rubbery discs sit in between each of the vertebrae. These add cushion and allow movement.   Muscles, tendons, and ligaments - These support the vertebrae and are used to stand upright as well as bend and flex the body. They are also called the \"soft tissues\" of the neck and back.  What causes low back pain? -- Many different things can cause low back pain. Most of the time, doctors do not know the exact cause.  Back pain can happen if you strain a muscle. This is often what has happened when a person \"throws out\" their back. This refers to pain that starts suddenly after physical activity, like lifting something heavy or bending over.  Back pain can also happen if you have:   Damaged, bulging, or torn discs   Arthritis affecting the joints of the spine   Bony growths on the vertebrae that crowd nearby nerves   A vertebra out of place   Narrowing in the spinal canal   A tumor or infection (but this is very rare)  Should I get an imaging test? -- Most people do not need an imaging test such an X-ray, CT scan, or MRI. Most cases of back pain go away a few weeks. Doctors usually do not order imaging tests unless there are signs of something unusual.  If your doctor does not order an imaging test, do not worry. They can still learn a lot about your pain just from looking you over and talking with you.  How can the doctor or nurse tell what is wrong just by talking to me? -- Your symptoms tell your doctor or nurse a lot about the cause of your pain. For example:   If your pain started after you did something specific, like lifting a heavy object or twisting your back, you might have strained a muscle   If your pain spreads down the back of 1 thigh, it could be a sign that 1 of the nerves that go to your leg is being pinched by a bulging or torn disc   If your pain goes all the way down " "both legs, it could be a sign that you have a narrowed spinal canal. This is most often due to bony growths on your spine.  How is back pain treated? -- Most people with an episode of low back pain do not have a serious medical problem, and can try simple treatments such as:   Staying active - The best thing you can do is to stay as active as possible. People with low back pain recover faster if they stay active. If your pain is severe, you might need to rest for a day or 2. But it's important to get back to walking and moving as soon as possible. While you should avoid heavy lifting and sports while your back hurts, try to keep doing your normal daily activities.   Heat - Some people find that it helps to use a heating pad or heated wrap. Be careful to avoid high heat settings to prevent skin burns.   Medicines - First, you can try pain medicines that you can get without a prescription. In many cases, doctors suggest first trying a nonsteroidal antiinflammatory drug, or \"NSAID.\" NSAIDs include ibuprofen (sample brand names: Advil, Motrin) and naproxen (sample brand name: Aleve). These might work better than acetaminophen (sample brand name: Tylenol) for back pain.  If non-prescription medicines do not help, let your doctor or nurse know. In some cases, doctors prescribe a medicine to relax the muscles (called a \"muscle relaxant\"). But keep in mind that muscle relaxants are not generally used in people older than 65. In older people, these medicines can cause side effects such as trouble urinating or confusion.   Treatments to help with symptoms - Some treatments might help you feel better for a little while. They include:   Spinal manipulation - This is when a chiropractor, physical therapist, or other professional moves or \"adjusts\" the joints of your back. If you want to try this, talk to your doctor or nurse first.   Acupuncture - This is when someone who knows traditional Chinese medicine inserts tiny needles " "into your body to block pain signals.   Massage - A massage therapist massages the muscles and other soft tissues in your back.  While back pain usually goes away within a few weeks, some people do continue to have pain for longer. In this case, additional treatments might include:   Self-care - This involves being aware of your pain. While you should rest when you need to, it's important to stay active as much as you can. Things like applying heat and doing gentle stretches can help you feel better, too.   Physical therapy - A physical therapist is an exercise expert who can teach you stretches and movements to help strengthen your muscles. The goal is to relieve pain but also help you get back to your normal activities.  Exercises you can try include walking, swimming, or using an exercise bike. Some people also find that silvino chi or yoga can help with their back pain. Finding activities you enjoy can help you stay active.   Reducing stress - Some people find that it helps to try something called \"mindfulness-based stress reduction.\" This involves going to a group program to practice relaxation and meditation. If your back pain is making you feel anxious or depressed, talk to your doctor or nurse. There are other treatments that can help with these problems.  Some people wonder if injections (shots) can help to relieve back pain. In some cases, doctors might recommend a shot of medicine to numb the area or reduce swelling. But this has only been proven to work in specific situations.  Only a small number of people will need surgery to treat back pain.  What can I do to keep from getting back pain again? -- The best thing you can do is to stay active. Doing exercises to strengthen and stretch your back can help. You can also:   Learn to lift using your legs instead of your back   Avoid sitting or standing in the same position for too long  Having back pain can be frustrating and scary. But it can help to know that " doing these things can lower your risk of having another episode.  All topics are updated as new evidence becomes available and our peer review process is complete.  This topic retrieved from [employer] on: May 10, 2024.  Topic 40029 Version 26.0  Release: 32.4.3 - C32.129  © 2024 [employer] and/or its affiliates. All rights reserved.  figure 1: Anatomy of the back     This drawing shows the different parts of the back. Back pain can be caused by problems with the muscles, ligaments, discs, bones (vertebrae), or nerves.  Graphic 54817 Version 6.0  Consumer Information Use and Disclaimer   Disclaimer: This generalized information is a limited summary of diagnosis, treatment, and/or medication information. It is not meant to be comprehensive and should be used as a tool to help the user understand and/or assess potential diagnostic and treatment options. It does NOT include all information about conditions, treatments, medications, side effects, or risks that may apply to a specific patient. It is not intended to be medical advice or a substitute for the medical advice, diagnosis, or treatment of a health care provider based on the health care provider's examination and assessment of a patient's specific and unique circumstances. Patients must speak with a health care provider for complete information about their health, medical questions, and treatment options, including any risks or benefits regarding use of medications. This information does not endorse any treatments or medications as safe, effective, or approved for treating a specific patient. [employer] and its affiliates disclaim any warranty or liability relating to this information or the use thereof.The use of this information is governed by the Terms of Use, available at https://www.wolterskluwer.com/en/know/clinical-effectiveness-terms. 2024© [employer], [employer] and its affiliates and/or licensors. All rights reserved.  Copyright   © 2024 [employer]  and/or its affiliates. All rights reserved.

## 2024-07-09 NOTE — PROGRESS NOTES
[facility]        NAME: Margaret Shepard is a 59 y.o. female  : 1965    MRN: [id number]  DATE: 2024  TIME: 6:58 PM    Assessment and Plan   Acute bilateral low back pain without sciatica [M54.50]  1. Acute bilateral low back pain without sciatica  cyclobenzaprine (FLEXERIL) 5 mg tablet        XR deferred as lack of trauma. Will treat for low back strain with muscle relaxer, caution advised. Encouraged continued supportive measures.  OTC Tylenol/Ibuprofen. Heat/Ice. Topical analgesics. Follow up with PCP in 3-5 days or proceed to emergency department for worsening symptoms.  Patient verbalized understanding of instructions given.       Patient Instructions     Patient Instructions     Take Flexeril as prescribed but do not drink alcohol, drive your vehicle, or operate heavy machinery  OTC Tylenol/Ibuprofen for pain  Heat/Ice  Topical analgesics  Gentle stretches and exercises   Follow up with PCP in 3-5 days.  Proceed to  ER if symptoms worsen.    If tests have been performed at [facility], our office will contact you with results if changes need to be made to the care plan discussed with you at the visit.  You can review your full results on [facility] MyChart.      Patient Education     Low back pain in adults   The Basics   Written by the doctors and editors at [facility]   How worried should I be about low back pain? -- Do not assume the worst. Almost everyone gets back pain at some point. Low back pain can be scary. But even when the pain is severe, it usually goes away on its own within a few weeks. The cases that require urgent care or surgery are rare.  See your doctor or nurse if you have back pain and you:   Recently had a fall or an injury to your back   Have numbness or weakness in your legs   Have problems with bladder or bowel control   Have unexplained weight loss   Have a fever or feel sick in other ways   Take steroid medicine, such as prednisone, on a regular basis   Have diabetes  "or a medical problem that weakens your immune system   Have a history of cancer or osteoporosis  You should also see a doctor if:   Your back pain is so severe that you cannot perform simple tasks   Your back pain does not start to improve within 4 weeks  What are the parts of the back? -- The back is made up of (figure 1):   Vertebrae - These are the bones of the spine. Each has a hole in the center. The vertebrae are stacked to form a hollow tube called the \"spinal canal.\" The spinal cord passes through this tube and is protected by the vertebrae.   Spinal cord and nerves - The spinal cord is the bundle of nerves that connects the brain to the rest of the body. It runs through the vertebrae. Nerves branch from the spinal cord and pass in between the vertebrae. From there, they connect to the arms, the legs, and the organs.   Discs - Rubbery discs sit in between each of the vertebrae. These add cushion and allow movement.   Muscles, tendons, and ligaments - These support the vertebrae and are used to stand upright as well as bend and flex the body. They are also called the \"soft tissues\" of the neck and back.  What causes low back pain? -- Many different things can cause low back pain. Most of the time, doctors do not know the exact cause.  Back pain can happen if you strain a muscle. This is often what has happened when a person \"throws out\" their back. This refers to pain that starts suddenly after physical activity, like lifting something heavy or bending over.  Back pain can also happen if you have:   Damaged, bulging, or torn discs   Arthritis affecting the joints of the spine   Bony growths on the vertebrae that crowd nearby nerves   A vertebra out of place   Narrowing in the spinal canal   A tumor or infection (but this is very rare)  Should I get an imaging test? -- Most people do not need an imaging test such an X-ray, CT scan, or MRI. Most cases of back pain go away a few weeks. Doctors usually do not " "order imaging tests unless there are signs of something unusual.  If your doctor does not order an imaging test, do not worry. They can still learn a lot about your pain just from looking you over and talking with you.  How can the doctor or nurse tell what is wrong just by talking to me? -- Your symptoms tell your doctor or nurse a lot about the cause of your pain. For example:   If your pain started after you did something specific, like lifting a heavy object or twisting your back, you might have strained a muscle   If your pain spreads down the back of 1 thigh, it could be a sign that 1 of the nerves that go to your leg is being pinched by a bulging or torn disc   If your pain goes all the way down both legs, it could be a sign that you have a narrowed spinal canal. This is most often due to bony growths on your spine.  How is back pain treated? -- Most people with an episode of low back pain do not have a serious medical problem, and can try simple treatments such as:   Staying active - The best thing you can do is to stay as active as possible. People with low back pain recover faster if they stay active. If your pain is severe, you might need to rest for a day or 2. But it's important to get back to walking and moving as soon as possible. While you should avoid heavy lifting and sports while your back hurts, try to keep doing your normal daily activities.   Heat - Some people find that it helps to use a heating pad or heated wrap. Be careful to avoid high heat settings to prevent skin burns.   Medicines - First, you can try pain medicines that you can get without a prescription. In many cases, doctors suggest first trying a nonsteroidal antiinflammatory drug, or \"NSAID.\" NSAIDs include ibuprofen (sample brand names: Advil, Motrin) and naproxen (sample brand name: Aleve). These might work better than acetaminophen (sample brand name: Tylenol) for back pain.  If non-prescription medicines do not help, let your " "doctor or nurse know. In some cases, doctors prescribe a medicine to relax the muscles (called a \"muscle relaxant\"). But keep in mind that muscle relaxants are not generally used in people older than 65. In older people, these medicines can cause side effects such as trouble urinating or confusion.   Treatments to help with symptoms - Some treatments might help you feel better for a little while. They include:   Spinal manipulation - This is when a chiropractor, physical therapist, or other professional moves or \"adjusts\" the joints of your back. If you want to try this, talk to your doctor or nurse first.   Acupuncture - This is when someone who knows traditional Chinese medicine inserts tiny needles into your body to block pain signals.   Massage - A massage therapist massages the muscles and other soft tissues in your back.  While back pain usually goes away within a few weeks, some people do continue to have pain for longer. In this case, additional treatments might include:   Self-care - This involves being aware of your pain. While you should rest when you need to, it's important to stay active as much as you can. Things like applying heat and doing gentle stretches can help you feel better, too.   Physical therapy - A physical therapist is an exercise expert who can teach you stretches and movements to help strengthen your muscles. The goal is to relieve pain but also help you get back to your normal activities.  Exercises you can try include walking, swimming, or using an exercise bike. Some people also find that silvino chi or yoga can help with their back pain. Finding activities you enjoy can help you stay active.   Reducing stress - Some people find that it helps to try something called \"mindfulness-based stress reduction.\" This involves going to a group program to practice relaxation and meditation. If your back pain is making you feel anxious or depressed, talk to your doctor or nurse. There are other " treatments that can help with these problems.  Some people wonder if injections (shots) can help to relieve back pain. In some cases, doctors might recommend a shot of medicine to numb the area or reduce swelling. But this has only been proven to work in specific situations.  Only a small number of people will need surgery to treat back pain.  What can I do to keep from getting back pain again? -- The best thing you can do is to stay active. Doing exercises to strengthen and stretch your back can help. You can also:   Learn to lift using your legs instead of your back   Avoid sitting or standing in the same position for too long  Having back pain can be frustrating and scary. But it can help to know that doing these things can lower your risk of having another episode.  All topics are updated as new evidence becomes available and our peer review process is complete.  This topic retrieved from [employer] on: May 10, 2024.  Topic 20650 Version 26.0  Release: 32.4.3 - C32.129  © 2024 UpToDate, Inc. and/or its affiliates. All rights reserved.  figure 1: Anatomy of the back     This drawing shows the different parts of the back. Back pain can be caused by problems with the muscles, ligaments, discs, bones (vertebrae), or nerves.  Graphic 56736 Version 6.0  Consumer Information Use and Disclaimer   Disclaimer: This generalized information is a limited summary of diagnosis, treatment, and/or medication information. It is not meant to be comprehensive and should be used as a tool to help the user understand and/or assess potential diagnostic and treatment options. It does NOT include all information about conditions, treatments, medications, side effects, or risks that may apply to a specific patient. It is not intended to be medical advice or a substitute for the medical advice, diagnosis, or treatment of a health care provider based on the health care provider's examination and assessment of a patient's specific and unique  circumstances. Patients must speak with a health care provider for complete information about their health, medical questions, and treatment options, including any risks or benefits regarding use of medications. This information does not endorse any treatments or medications as safe, effective, or approved for treating a specific patient. UpToDate, Inc. and its affiliates disclaim any warranty or liability relating to this information or the use thereof.The use of this information is governed by the Terms of Use, available at https://www.[employer].com/en/know/clinical-effectiveness-terms. 2024© UpToDate, Inc. and its affiliates and/or licensors. All rights reserved.  Copyright   © 2024 UpToDate, Inc. and/or its affiliates. All rights reserved.        Chief Complaint     Chief Complaint   Patient presents with    Back Pain     Woke up today with pain in lower back. Was carrying wash up and down 2 flights 1 day ago         History of Present Illness       59-year-old female with a past medical history significant for HIV presents with complaints of low back pain x 1 day.  Patient reports carrying laundry up and down 2 flights of stairs yesterday many times and then today developed low back pain upon awakening.  She reports located across entire low back and denies numbness, tingling, weakness, loss of bowel or bladder incontinence, or saddle anesthesia.  She did take 1 dose of OTC Ibuprofen 30 minutes prior to arrival.  Denies prior history of back injuries or surgeries.    Back Pain  Pertinent negatives include no abdominal pain, chest pain, fever, numbness or weakness.       Review of Systems   Review of Systems   Constitutional:  Negative for chills and fever.   Respiratory:  Negative for cough, shortness of breath and wheezing.    Cardiovascular:  Negative for chest pain.   Gastrointestinal:  Negative for abdominal pain, diarrhea, nausea and vomiting.   Genitourinary:  Negative for decreased urine volume and  "difficulty urinating.   Musculoskeletal:  Positive for back pain.   Skin:  Negative for rash.   Neurological:  Negative for weakness and numbness.         Current Medications       Current Outpatient Medications:     atorvastatin (LIPITOR) 40 mg tablet, Take 40 mg by mouth in the morning, Disp: , Rfl:     Biktarvy -25 MG tablet, TAKE 1 TABLET BY MOUTH EVERY DAY this replaces Descovy and Isentress, Disp: , Rfl:     cyclobenzaprine (FLEXERIL) 5 mg tablet, Take 1 tablet (5 mg total) by mouth 3 (three) times a day as needed for muscle spasms, Disp: 10 tablet, Rfl: 0    pantoprazole (PROTONIX) 40 mg tablet, Take 40 mg by mouth daily at bedtime, Disp: , Rfl:     pregabalin (LYRICA) 75 mg capsule, Take 75 mg by mouth 2 (two) times a day, Disp: , Rfl:     traZODone (DESYREL) 100 mg tablet, Take 100 mg by mouth daily at bedtime, Disp: , Rfl:     Isentress 400 MG tablet, Take 400 mg by mouth 2 (two) times a day (Patient not taking: Reported on 7/9/2024), Disp: , Rfl:     Current Allergies     Allergies as of 07/09/2024    (No Known Allergies)            The following portions of the patient's history were reviewed and updated as appropriate: allergies, current medications, past family history, past medical history, past social history, past surgical history and problem list.     Past Medical History:   Diagnosis Date    Disease of thyroid gland     GERD (gastroesophageal reflux disease)     High cholesterol     HIV carrier (HCC)        Past Surgical History:   Procedure Laterality Date    HYSTERECTOMY      TUBAL LIGATION         Family History   Problem Relation Age of Onset    Heart disease Mother     Hypertension Father          Medications have been verified.        Objective   /68   Pulse 100   Temp (!) 97 °F (36.1 °C)   Resp 16   Ht 5' 2\" (1.575 m)   Wt 78.9 kg (174 lb)   SpO2 98%   BMI 31.83 kg/m²   No LMP recorded. Patient is postmenopausal.       Physical Exam     Physical Exam  Vitals and nursing " note reviewed.   Constitutional:       General: She is not in acute distress.     Appearance: She is not toxic-appearing.   HENT:      Head: Normocephalic.   Eyes:      Conjunctiva/sclera: Conjunctivae normal.   Pulmonary:      Effort: Pulmonary effort is normal.   Musculoskeletal:         General: Tenderness present.      Thoracic back: No tenderness or bony tenderness.      Lumbar back: Tenderness and bony tenderness present. No spasms. Normal range of motion. Negative right straight leg raise test and negative left straight leg raise test.   Skin:     General: Skin is warm and dry.   Neurological:      Mental Status: She is alert and oriented to person, place, and time.      Sensory: Sensation is intact.      Motor: Motor function is intact.      Gait: Gait is intact.   Psychiatric:         Mood and Affect: Mood normal.         Behavior: Behavior normal.

## 2024-11-04 ENCOUNTER — OPTICAL OFFICE (OUTPATIENT)
Dept: URBAN - NONMETROPOLITAN AREA CLINIC 4 | Facility: CLINIC | Age: 59
Setting detail: OPHTHALMOLOGY
End: 2024-11-04
Payer: COMMERCIAL

## 2024-11-04 ENCOUNTER — DOCTOR'S OFFICE (OUTPATIENT)
Dept: URBAN - NONMETROPOLITAN AREA CLINIC 1 | Facility: CLINIC | Age: 59
Setting detail: OPHTHALMOLOGY
End: 2024-11-04
Payer: COMMERCIAL

## 2024-11-04 DIAGNOSIS — H52.03: ICD-10-CM

## 2024-11-04 DIAGNOSIS — H52.4: ICD-10-CM

## 2024-11-04 PROCEDURE — V2750 ANTI-REFLECTIVE COATING: HCPCS | Mod: LT | Performed by: OPTOMETRIST

## 2024-11-04 PROCEDURE — V2784 LENS POLYCARB OR EQUAL: HCPCS | Performed by: OPTOMETRIST

## 2024-11-04 PROCEDURE — V2750 ANTI-REFLECTIVE COATING: HCPCS | Performed by: OPTOMETRIST

## 2024-11-04 PROCEDURE — 92014 COMPRE OPH EXAM EST PT 1/>: CPT | Performed by: OPTOMETRIST

## 2024-11-04 PROCEDURE — V2784 LENS POLYCARB OR EQUAL: HCPCS | Mod: LT | Performed by: OPTOMETRIST

## 2024-11-04 PROCEDURE — V2781 PROGRESSIVE LENS PER LENS: HCPCS | Mod: LT | Performed by: OPTOMETRIST

## 2024-11-04 PROCEDURE — V2020 VISION SVCS FRAMES PURCHASES: HCPCS | Performed by: OPTOMETRIST

## 2024-11-04 PROCEDURE — V2744 TINT PHOTOCHROMATIC LENS/ES: HCPCS | Performed by: OPTOMETRIST

## 2024-11-04 PROCEDURE — V2781 PROGRESSIVE LENS PER LENS: HCPCS | Performed by: OPTOMETRIST

## 2024-11-04 PROCEDURE — V2744 TINT PHOTOCHROMATIC LENS/ES: HCPCS | Mod: LT | Performed by: OPTOMETRIST

## 2024-11-04 ASSESSMENT — TONOMETRY
OS_IOP_MMHG: 16
OD_IOP_MMHG: 16

## 2024-11-04 ASSESSMENT — REFRACTION_MANIFEST
OD_VA2: 20/20-2
OD_AXIS: 090
OS_VA1: 20/25+2
OS_CYLINDER: -1.00
OS_VA2: 20/25+2
OS_ADD: +2.50
OD_VA1: 20/20-2
OD_SPHERE: +3.00
OD_ADD: +2.50
OS_SPHERE: +5.00
OS_AXIS: 075
OD_CYLINDER: -0.50

## 2024-11-04 ASSESSMENT — KERATOMETRY
OS_K2POWER_DIOPTERS: 45.00
OD_K2POWER_DIOPTERS: 45.00
OS_K1POWER_DIOPTERS: 44.25
OS_AXISANGLE_DEGREES: 133
OD_K1POWER_DIOPTERS: 44.25
OD_AXISANGLE_DEGREES: 059

## 2024-11-04 ASSESSMENT — REFRACTION_CURRENTRX
OD_OVR_VA: 20/
OS_OVR_VA: 20/
OD_SPHERE: +3.00
OS_CYLINDER: -0.75
OS_SPHERE: +5.00
OS_VPRISM_DIRECTION: BF
OD_CYLINDER: -0.25
OS_AXIS: 062
OS_ADD: +2.50
OD_AXIS: 085
OD_VPRISM_DIRECTION: BF
OD_ADD: +2.25

## 2024-11-04 ASSESSMENT — REFRACTION_AUTOREFRACTION
OS_AXIS: 073
OD_SPHERE: +3.50
OS_SPHERE: +5.50
OD_CYLINDER: -0.75
OD_AXIS: 106
OS_CYLINDER: -1.75

## 2024-11-04 ASSESSMENT — CONFRONTATIONAL VISUAL FIELD TEST (CVF)
OD_FINDINGS: FULL
OS_FINDINGS: FULL

## 2024-11-04 ASSESSMENT — VISUAL ACUITY
OD_BCVA: 20/30
OS_BCVA: 20/25

## 2025-05-15 RX ORDER — EMTRICITABINE AND TENOFOVIR DISOPROXIL FUMARATE 200; 300 MG/1; MG/1
1 TABLET, FILM COATED ORAL DAILY
COMMUNITY

## 2025-05-19 ENCOUNTER — OFFICE VISIT (OUTPATIENT)
Dept: PODIATRY | Age: 60
End: 2025-05-19
Payer: COMMERCIAL

## 2025-05-19 ENCOUNTER — HOSPITAL ENCOUNTER (OUTPATIENT)
Dept: RADIOLOGY | Facility: CLINIC | Age: 60
Discharge: HOME | End: 2025-05-19
Attending: STUDENT IN AN ORGANIZED HEALTH CARE EDUCATION/TRAINING PROGRAM
Payer: COMMERCIAL

## 2025-05-19 VITALS — BODY MASS INDEX: 32.83 KG/M2 | WEIGHT: 178.4 LBS | HEIGHT: 62 IN

## 2025-05-19 DIAGNOSIS — I83.893 VARICOSE VEINS OF LEG WITH SWELLING, BILATERAL: ICD-10-CM

## 2025-05-19 DIAGNOSIS — M54.50 LOW BACK PAIN, UNSPECIFIED BACK PAIN LATERALITY, UNSPECIFIED CHRONICITY, UNSPECIFIED WHETHER SCIATICA PRESENT: ICD-10-CM

## 2025-05-19 DIAGNOSIS — M79.671 PAIN IN BOTH FEET: ICD-10-CM

## 2025-05-19 DIAGNOSIS — M76.71 PERONEAL TENDINITIS OF RIGHT LOWER EXTREMITY: ICD-10-CM

## 2025-05-19 DIAGNOSIS — M79.671 PAIN IN BOTH FEET: Primary | ICD-10-CM

## 2025-05-19 DIAGNOSIS — M79.672 PAIN IN BOTH FEET: Primary | ICD-10-CM

## 2025-05-19 DIAGNOSIS — M79.672 PAIN IN BOTH FEET: ICD-10-CM

## 2025-05-19 PROCEDURE — 73630 X-RAY EXAM OF FOOT: CPT

## 2025-05-19 PROCEDURE — 99204 OFFICE O/P NEW MOD 45 MIN: CPT | Performed by: STUDENT IN AN ORGANIZED HEALTH CARE EDUCATION/TRAINING PROGRAM

## 2025-05-19 RX ORDER — PREGABALIN 100 MG/1
1 CAPSULE ORAL 2 TIMES DAILY
COMMUNITY
Start: 2025-05-05

## 2025-05-19 RX ORDER — QUETIAPINE FUMARATE 100 MG/1
100 TABLET, FILM COATED ORAL
COMMUNITY

## 2025-05-19 RX ORDER — MELOXICAM 7.5 MG/1
7.5 TABLET ORAL DAILY
Qty: 10 TABLET | Refills: 0 | Status: SHIPPED | OUTPATIENT
Start: 2025-05-19

## 2025-05-19 RX ORDER — PAROXETINE 10 MG/1
10 TABLET, FILM COATED ORAL
COMMUNITY

## 2025-05-19 RX ORDER — ATORVASTATIN CALCIUM 80 MG/1
80 TABLET, FILM COATED ORAL DAILY
COMMUNITY
Start: 2025-05-05

## 2025-05-19 NOTE — PATIENT INSTRUCTIONS
Foot Pain Home Therapy    Stretch. Place painful foot in back with heel on ground and lean against wall. Do not lift heel off of ground. You will feel the stretch in the calf muscles and possibly the heel. Hold the stretch for 20-30 seconds. Do this at least 5-6 times per day. It is best done after exercise when your muscles are warm.  Wear supportive shoes at all times. Avoid flip-flops, flat sandals, barefoot walking (never walk barefoot, even at home). Generally avoid shoes that are too flexible and bend in the arch. Your shoes should only slightly bend in the toe area, not the middle. Running sneakers are often the best choice.  Supportive sneaker brands: Erickson, On Cloud, Hoka, Altra, New Balance, Asics, Mizuno  [employer] (discount if mention Dr benz)  Fleet Feet [place]  [facility]   [employer] [place]  Supportive daily shoe brands: Vionic, Orthofeet, Debra, Dansko, Chacos, Stahl, Teva, Birkenstock  Supportive home shoes: Cody Ambrose (recovery slides)  Purchase over the counter topical pain creams such as Voltarin gel, biofreeze, or CBD cream - will need to apply 2-3 times per day for benefit. + deep tissue massage.   Look in to over the counter shoe inserts/arch supports such as Superfeet, Powerstep arch supports. These are all available on [employer].com as well as their individual website's.       Achilles Tendon Stretching Exercises    A) Standing Gastrocnemius stretch  Place hands on wall or chair. If using wall, put your hands at eye level.  Step the leg you want to stretch behind you. Keep your back heel on the floor and point your toes straight ahead or slightly inward towards the heel of the opposite foot.   Bend your knee toward the wall while keeping your back leg straight.  Lean toward the wall until you feel a gentle stretch in you calf of the straight leg. Don't lean so far that you feel pain.  Hold for 15 seconds. Complete 3 reps.    B) Standing soleus  stretch  Place your hands on the wall or chair. If using wall, put your hands at eye level.  Step the leg you want to stretch behind you (your back foot will need to be closer to the front foot than the above stretch). Keep your back heel on the floor and point your toes straight ahead or slightly inward towards the heel of the opposite foot.   Bend both your front and back knee at the same time (may help to stick your butt out). You do not need to lean towards the wall, just bend the knees.   Lean toward the wall until you feel a gentle stretch in you calf of the straight leg. Don't lean so far that you feel pain.  Hold for 15 seconds. Complete 3 reps.          Keep these tips and tricks in mind to get the most out of your stretching;  Take your time - move slowly, whether you are deepening into a stretch or changing positions. This will limit the risk of injury & discomfort.  Avoid bouncing - quick sudden movements will only worsen achilles tendon issues. Stay relaxed during stretch.  Keep your heel down and toes straight ahead or slightly inward - this will allow the achilles tendon to stretch properly.   Stop if you feel pain - Don't strain or force your muscle. If you feel sharp pain, stop immediately.

## 2025-05-19 NOTE — ASSESSMENT & PLAN NOTE
Orders:    Ankle Cude ankle/Ankle Brace    Ambulatory referral to Physical Therapy; Future    meloxicam (Mobic) 7.5 mg tablet; Take 1 tablet (7.5 mg total) by mouth daily

## 2025-05-19 NOTE — PROGRESS NOTES
Name: Margaret Shepard      : 1965      MRN: [id number]  Encounter Provider: Natasha Geiger DPM  Encounter Date: 2025   Encounter department: [facility] [place]  :  Assessment & Plan  Pain in both feet    Orders:    XR foot 3+ vw right; Future    XR foot 3+ vw left; Future    Ambulatory referral to Spine & Pain Management; Future    Ambulatory referral to Physical Therapy; Future    EMG; Future    meloxicam (Mobic) 7.5 mg tablet; Take 1 tablet (7.5 mg total) by mouth daily    VAS Lower extremity venous insufficiency duplex, bilateral w/ measurements; Future    Low back pain, unspecified back pain laterality, unspecified chronicity, unspecified whether sciatica present    Orders:    Ambulatory referral to Spine & Pain Management; Future    EMG; Future    Varicose veins of leg with swelling, bilateral    Orders:    VAS Lower extremity venous insufficiency duplex, bilateral w/ measurements; Future    Peroneal tendinitis of right lower extremity    Orders:    Ankle Cude ankle/Ankle Brace    Ambulatory referral to Physical Therapy; Future    meloxicam (Mobic) 7.5 mg tablet; Take 1 tablet (7.5 mg total) by mouth daily    Imaging Reviewed at this visit (I personally reviewed/independently interpreted images and reports in PACS)  XR right and left foot WB 6v 25: No acute osseous abnormalities noted. Small plantar calcaneal spurs.  Accessory peroneal bone B/L      PLAN:  I reviewed clinical exam and radiographic imaging (XR) with patient in detail today. I have discussed with the patient the pathophysiology of this diagnosis and reviewed how the examination correlates with this diagnosis.  ED note from 25 reviewed   The differential diagnosis includes plantar fasciitis, Zaragoza's nerve entrapment, insertional peroneal tendonitis R>L, sinus tarsi syndrome R, stress fracture, polyneuropathy, L5 and S1 nerve root compression, neurogenic intermittent claudication, venous insufficiency,  degenerative changes (calcaneal spurs, arthrosis of the joints of the foot), and inflammatory conditions of the ligaments and fascia of the foot and ankle.    EMG RLE ordered  Spin/pain referral given for lower back pain with radicular features  Lace up ankle brace right foot applied  Shoegear and arch support recs given  I ordered the addition of PT at this visit to aide in reduction of equinus and also address the plantar fascia and gastroc aponeurosis with graston technique.   I rx'd short course of meloxicam today and discussed close monitoring for stomach bleed symptoms which she is to stop the medication immediately if she has such.  Venous reflux study ordered  We discussed at length lifestyle modifications for LE edema. We recommend to not sleep in a recliner. To avoid periods of sitting with legs in a dependent position. To elevate legs and lay flat for periods in the day and at night. To take diuretics as directed. Also recommend weight loss, increased ambulation, increased activity, and monitor diet especially sodium intake. To use compression stockings.     Smoking cessation recommended  RTC 6wks (MRI right if no improvement)    History of Present Illness   HPI  Margaret Shepard is a 60 y.o. female who presents to clinic for B/L foot pain and swelling present for 4 months, presented 1wk after a fall.   Notes pain to outside of right foot, plantar heel and dorsal foot burning. Pain when she puts pressure on site. No issues prior.  Notes swelling to both feet, ankles and tops of feet. Worse at end of day.   + lower back pain extending to buttock and thighs.  No calf pain.    Went to ED 4/18/25 due to this.   PMH sig for HIV, prediabetes, HLD, mild obesity. Current smoker.       Review of Systems   Constitutional:  Negative for activity change, chills and fever.   HENT: Negative.     Respiratory:  Negative for cough, chest tightness and shortness of breath.    Cardiovascular:  Negative for chest pain and leg  "swelling.   Endocrine: Negative.    Genitourinary: Negative.    Musculoskeletal:  Positive for back pain and gait problem.   Neurological:  Negative for numbness.   Psychiatric/Behavioral: Negative.  Negative for agitation and behavioral problems.           Objective   Ht 5' 2\" (1.575 m)   Wt 80.9 kg (178 lb 6.4 oz)   BMI 32.63 kg/m²      Physical Exam  Constitutional:       General: She is not in acute distress.     Appearance: Normal appearance. She is obese. She is not ill-appearing.     Cardiovascular:      Pulses: Normal pulses.      Comments: Mild B/L LE edema and varicosities. Pedal pulses palpable. Toes warm  Pulmonary:      Effort: Pulmonary effort is normal.     Musculoskeletal:         General: Tenderness (Right heel at insertion of plantar fascia to calcaneus, global medial/lateral heel, sinus tarsi. R>L 5th met base.) present.      Comments: MMT 5/5 in all planes. Toe and ankle ROM intact and without pain.   Gastroc-soleal equinus.      Skin:     General: Skin is warm and dry.      Findings: No erythema or lesion.     Neurological:      General: No focal deficit present.      Mental Status: She is alert and oriented to person, place, and time.      Sensory: No sensory deficit.      Comments: + intermittent burning right dorsal foot.          "

## 2025-05-21 ENCOUNTER — EVALUATION (OUTPATIENT)
Age: 60
End: 2025-05-21
Attending: STUDENT IN AN ORGANIZED HEALTH CARE EDUCATION/TRAINING PROGRAM
Payer: COMMERCIAL

## 2025-05-21 DIAGNOSIS — Q66.01 TALIPES EQUINOVARUS OF RIGHT LOWER EXTREMITY: ICD-10-CM

## 2025-05-21 DIAGNOSIS — M79.671 RIGHT FOOT PAIN: Primary | ICD-10-CM

## 2025-05-21 PROCEDURE — 97163 PT EVAL HIGH COMPLEX 45 MIN: CPT | Performed by: PHYSICAL THERAPIST

## 2025-05-21 PROCEDURE — 97110 THERAPEUTIC EXERCISES: CPT | Performed by: PHYSICAL THERAPIST

## 2025-05-21 NOTE — PROGRESS NOTES
PT Evaluation     Today's date: 2025  Patient name: Margaret Shepard  : 1965  MRN: [id number]  Referring provider: Natasha Geiger DPM  Dx:   Encounter Diagnosis     ICD-10-CM    1. Right foot pain  M79.671       2. Talipes equinovarus of right lower extremity  Q66.01           Start Time: 1630  Stop Time: 1730  Total time in clinic (min): 60 minutes    Assessment/Plan    Subjective Evaluation    History of Present Illness  Date of onset: 2025  Mechanism of injury: Margaret Shepard is a 60 y.o. y/o female who reports 4 mth  Hx of (B) R>L foot pain and swelling which started when her legs gave out while walking. Legs have given out 2x in last 4mth. Has lateral and dorsal burning sensation. CC is walk safely because she's reliant on kids and others due to instability. Symptoms aggravated by walking around the block and shopping, stairs, and getting up from floor. Symptoms improved by compression socks and tylenol.    Symptoms change throughout the day:  No   Relevant Surgical Hx: none  Pacemaker: No   Cancer: No             Not a recurrent problem   Quality of life: good    Pain  Current pain ratin        Objective     Active Range of Motion     Lumbar   Flexion:  WFL and with pain  Extension:  with pain Restriction level: moderate  Left lateral flexion:  Restriction level: moderate  Right lateral flexion:  Restriction level: moderate    Strength/Myotome Testing     Lumbar   Left   Normal strength  Heel walk: normal  Toe walk: normal    Right   Normal strength  Heel walk: normal  Toe walk: normal             Precautions:       Manuals                                                                 Neuro Re-Ed                                                                                                        Ther Ex                                                                                                                     Ther Activity                                       Gait Training                                        Modalities

## 2025-05-27 ENCOUNTER — OFFICE VISIT (OUTPATIENT)
Age: 60
End: 2025-05-27
Attending: STUDENT IN AN ORGANIZED HEALTH CARE EDUCATION/TRAINING PROGRAM
Payer: COMMERCIAL

## 2025-05-27 DIAGNOSIS — Q66.01 TALIPES EQUINOVARUS OF RIGHT LOWER EXTREMITY: ICD-10-CM

## 2025-05-27 DIAGNOSIS — M79.671 RIGHT FOOT PAIN: Primary | ICD-10-CM

## 2025-05-27 PROCEDURE — 97110 THERAPEUTIC EXERCISES: CPT

## 2025-05-27 PROCEDURE — 97112 NEUROMUSCULAR REEDUCATION: CPT

## 2025-05-27 NOTE — HOME EXERCISE EDUCATION
Program_ID:[id number]   Access Code: [email or web address]  URL: [email or web address]  Date: 05-  Prepared By: Sophia Castillo    Program Notes      Exercises      - Gastroc Stretch on Wall - 1 x daily - 7 x weekly -  sets - 10 reps - 10 hold      - Standing Single Leg Stance with Counter Support - 1 x daily - 7 x weekly -  sets - 3 reps - 30 hold      - Standing Knee Flexion Stretch on Step - 1 x daily - 7 x weekly -  sets - 10 reps - 10 hold

## 2025-05-27 NOTE — PROGRESS NOTES
Daily Note     Today's date: 2025  Patient name: Margaret Shepard  : 1965  MRN: [id number]  Referring provider: Natasha Geiger DPM  Dx:   Encounter Diagnosis     ICD-10-CM    1. Right foot pain  M79.671       2. Talipes equinovarus of right lower extremity  Q66.01                      Subjective: ***      Objective: See treatment diary below      Assessment: Tolerated treatment {Tolerated treatment :0864196640}. Patient {assessment:9328713194}      Plan: {PLAN:6751219206}     Precautions:       Manuals                                                                 Neuro Re-Ed                                                                                                        Ther Ex                                                                                                                     Ther Activity                                       Gait Training                                       Modalities

## 2025-05-27 NOTE — PROGRESS NOTES
"Daily Note     Today's date: 2025  Patient name: Margaret Shepard  : 1965  MRN: [id number]  Referring provider: Natasha Geiger DPM  Dx:   Encounter Diagnosis     ICD-10-CM    1. Right foot pain  M79.671       2. Talipes equinovarus of right lower extremity  Q66.01                      Subjective: Reports no significant change from IE. Patent reports swelling in both ankles today but says she did a lot of walking yesterday at the [facility]. Denies pain in ankle currently but is wearing right ankle brace.       Objective: See treatment diary below      Assessment: Tolerated treatment well. Provided updated HEP for completion at home. Demonstrates good form but advised to perform exercises without brace at home. Performed SLS with ball toss with greater difficulty R vs L LE. Close SBA with occasional CGA required. Patient apprehensive to exercises because of history of falls. Patient would benefit from continued PT      Plan: Continue per plan of care.  Progress treatment as tolerated.       Visit/Unit Tracking  AUTH Status:  Date 2025     Ins Type: CMS Used 1 2     Auth Req: No        PT/OT Limit: BOMN Remaining           Pertinent Findings:      POC End Date: 25                                                                                          Test / Measure  2025   FOTO (Predicted 71) 55   LLTT  (+)   SLR/Slump (-)   R ankle DF  5*   SLS Balance  L: 8\"  R: 2\"     Precautions: Fall risk      Manuals 2025                                                               Neuro Re-Ed                          Nerve glides  2x20 B            SLS   10x red 1 kg ball toss                                                               Ther Ex                          Self CKC DF mob 3way   X10 ea 3 way 20x ea            Gastroc stretch on FR 6x20\" 10x10\" ea           Rb - activity tolerance   5'                                      Edu 5'            Ther Activity           "                             Gait Training                                       Modalities

## 2025-05-28 ENCOUNTER — APPOINTMENT (OUTPATIENT)
Age: 60
End: 2025-05-28
Attending: STUDENT IN AN ORGANIZED HEALTH CARE EDUCATION/TRAINING PROGRAM
Payer: COMMERCIAL

## 2025-06-02 ENCOUNTER — OFFICE VISIT (OUTPATIENT)
Age: 60
End: 2025-06-02
Attending: STUDENT IN AN ORGANIZED HEALTH CARE EDUCATION/TRAINING PROGRAM
Payer: COMMERCIAL

## 2025-06-02 DIAGNOSIS — M79.671 RIGHT FOOT PAIN: Primary | ICD-10-CM

## 2025-06-02 DIAGNOSIS — Q66.01 TALIPES EQUINOVARUS OF RIGHT LOWER EXTREMITY: ICD-10-CM

## 2025-06-02 PROCEDURE — 97112 NEUROMUSCULAR REEDUCATION: CPT

## 2025-06-02 PROCEDURE — 97110 THERAPEUTIC EXERCISES: CPT

## 2025-06-02 NOTE — PROGRESS NOTES
"Daily Note     Today's date: 2025  Patient name: Margaret Shepard  : 1965  MRN: [id number]  Referring provider: Natasha Geiger DPM  Dx:   Encounter Diagnosis     ICD-10-CM    1. Right foot pain  M79.671       2. Talipes equinovarus of right lower extremity  Q66.01                      Subjective: Reports pain in foot/ankle is feeling better. Not currently wearing brace, hasn't been wearing at home unless having more pain, wore maybe 2 days ago. Has been working on balance at home but doesn't find it any easier.       Objective: See treatment diary below      Assessment: Tolerated treatment well. Patient notes discomfort with sitting during nerve glides. Provided standing lumbar extension with noted improvement in ambulation tolerance and seated tolerance following. Advised to perform every other hour at home or while actively having lumbar/glute pain. Verbalizes understanding. Progressions with LE strength with good tolerance. Patient overall improving with tolerance to activity but continues to be significant challenge by SLS balance. Patient would benefit from continued PT.      Plan: Continue per plan of care.  Progress treatment as tolerated.       Visit/Unit Tracking  AUTH Status:  Date 2025    Ins Type: CMS Used 1 2 3    Auth Req: No        PT/OT Limit: BOMN Remaining           Pertinent Findings:      POC End Date: 25                                                                                          Test / Measure  2025   FOTO (Predicted 71) 55   LLTT  (+)   SLR/Slump (-)   R ankle DF  5*   SLS Balance  L: 8\"  R: 2\"     Precautions: Fall risk      Manuals 2025                                                              Neuro Re-Ed                          Nerve glides  2x20 B  2x20 B          SLS   10x red 1 kg ball toss 10x red 1 kg ball toss          Heel raise   30x           squat   2x10 airex on chair                                     Ther Ex     " "                     Self CKC DF mob 3way   X10 ea 3 way 20x ea  3 way 20x ea           Gastroc stretch on FR 6x20\" 10x10\" ea 10x10\"           Rb - activity tolerance   5'  8'          Standing lumbar ext   2x10                       Edu 5'            Ther Activity                                       Gait Training                                       Modalities                                                  "

## 2025-06-04 ENCOUNTER — OFFICE VISIT (OUTPATIENT)
Age: 60
End: 2025-06-04
Attending: STUDENT IN AN ORGANIZED HEALTH CARE EDUCATION/TRAINING PROGRAM
Payer: COMMERCIAL

## 2025-06-04 DIAGNOSIS — Q66.01 TALIPES EQUINOVARUS OF RIGHT LOWER EXTREMITY: ICD-10-CM

## 2025-06-04 DIAGNOSIS — M79.671 RIGHT FOOT PAIN: Primary | ICD-10-CM

## 2025-06-04 PROCEDURE — 97110 THERAPEUTIC EXERCISES: CPT | Performed by: PHYSICAL THERAPIST

## 2025-06-04 PROCEDURE — 97112 NEUROMUSCULAR REEDUCATION: CPT | Performed by: PHYSICAL THERAPIST

## 2025-06-04 NOTE — PROGRESS NOTES
"Daily Note     Today's date: 2025  Patient name: Margaret Shepard  : 1965  MRN: [id number]  Referring provider: Natasha Geiger DPM  Dx:   Encounter Diagnosis     ICD-10-CM    1. Right foot pain  M79.671       2. Talipes equinovarus of right lower extremity  Q66.01           Start Time: 1645  Stop Time: 1730  Total time in clinic (min): 45 minutes    Subjective: Reports pain in foot/ankle is feeling better.  SLS balance tasks are most challenging. Didn't feel lumbar Ext made much of a difference.       Objective: See treatment diary below      Assessment: Tolerated treatment well. Patient notes discomfort with sitting during nerve glides. Progressions with LE strength with good tolerance. Patient overall improving with tolerance to activity but continues to be significant challenge by SLS balance. Patient would benefit from continued PT.      Plan: Continue per plan of care.  Progress treatment as tolerated.       Visit/Unit Tracking  AUTH Status:  Date 2025   Ins Type: CMS Used 1 2 3 4   Auth Req: No        PT/OT Limit: BOMN Remaining           Pertinent Findings:      POC End Date: 25                                                                                          Test / Measure  2025   FOTO (Predicted 71) 55   LLTT  (+)   SLR/Slump (-)   R ankle DF  5*   SLS Balance  L: 8\"  R: 2\"     Precautions: Fall risk      Manuals 2025                                                             Neuro Re-Ed                          Nerve glides  2x20 B  2x20 B 2x20 B         SLS   10x red 1 kg ball toss 10x red 1 kg ball toss 10x red 1 kg ball toss         Heel raise   30x  30x          squat   2x10 airex on chair  3x10 airex on chair                                    Ther Ex                          Self CKC DF mob 3way   X10 ea 3 way 20x ea  3 way 20x ea  3 way 20x ea          Gastroc stretch on FR 6x20\" 10x10\" ea 10x10\"  10x10\"          Rb - activity " tolerance   5'  8' 8'         Standing lumbar ext   2x10                       Edu 5'            Ther Activity                                       Gait Training                                       Modalities

## 2025-06-05 ENCOUNTER — HOSPITAL ENCOUNTER (OUTPATIENT)
Facility: CLINIC | Age: 60
Discharge: HOME | End: 2025-06-05
Attending: STUDENT IN AN ORGANIZED HEALTH CARE EDUCATION/TRAINING PROGRAM
Payer: COMMERCIAL

## 2025-06-05 DIAGNOSIS — M79.672 PAIN IN BOTH FEET: ICD-10-CM

## 2025-06-05 DIAGNOSIS — M79.671 PAIN IN BOTH FEET: ICD-10-CM

## 2025-06-05 DIAGNOSIS — I83.893 VARICOSE VEINS OF LEG WITH SWELLING, BILATERAL: ICD-10-CM

## 2025-06-05 PROCEDURE — 93970 EXTREMITY STUDY: CPT | Performed by: SURGERY

## 2025-06-05 PROCEDURE — 93970 EXTREMITY STUDY: CPT

## 2025-06-06 ENCOUNTER — RESULTS FOLLOW-UP (OUTPATIENT)
Dept: WOUND CARE | Facility: CLINIC | Age: 60
End: 2025-06-06

## 2025-06-06 RX ORDER — DOXEPIN 6 MG/1
TABLET, FILM COATED ORAL EVERY 24 HOURS
COMMUNITY
Start: 2025-05-27

## 2025-06-06 RX ORDER — BUPROPION HYDROCHLORIDE 150 MG/1
TABLET, EXTENDED RELEASE ORAL
COMMUNITY
Start: 2025-06-01

## 2025-06-06 RX ORDER — DAPAGLIFLOZIN 10 MG/1
TABLET, FILM COATED ORAL EVERY 24 HOURS
COMMUNITY
Start: 2025-05-27 | End: 2025-06-26

## 2025-06-06 RX ORDER — PREGABALIN 150 MG/1
1 CAPSULE ORAL 2 TIMES DAILY
COMMUNITY
Start: 2025-05-27

## 2025-06-09 ENCOUNTER — OFFICE VISIT (OUTPATIENT)
Age: 60
End: 2025-06-09
Attending: STUDENT IN AN ORGANIZED HEALTH CARE EDUCATION/TRAINING PROGRAM
Payer: COMMERCIAL

## 2025-06-09 DIAGNOSIS — Q66.01 TALIPES EQUINOVARUS OF RIGHT LOWER EXTREMITY: ICD-10-CM

## 2025-06-09 DIAGNOSIS — M79.671 RIGHT FOOT PAIN: Primary | ICD-10-CM

## 2025-06-09 PROCEDURE — 97112 NEUROMUSCULAR REEDUCATION: CPT | Performed by: PHYSICAL THERAPIST

## 2025-06-09 PROCEDURE — 97110 THERAPEUTIC EXERCISES: CPT | Performed by: PHYSICAL THERAPIST

## 2025-06-09 NOTE — PROGRESS NOTES
"Daily Note     Today's date: 6/10/2025  Patient name: Margaret Shepard  : 1965  MRN: [id number]  Referring provider: Natasha Geiger DPM  Dx:   Encounter Diagnosis     ICD-10-CM    1. Right foot pain  M79.671       2. Talipes equinovarus of right lower extremity  Q66.01           Start Time: 1615  Stop Time: 1700  Total time in clinic (min): 45 minutes    Subjective: Reports pain in foot/ankle is feeling better.  SLS balance tasks are most challenging. Didn't feel lumbar Ext made much of a difference.       Objective: See treatment diary below      Assessment: Tolerated treatment well. Patient notes discomfort with sitting during nerve glides. Progressions with LE strength with good tolerance. Patient overall improving with tolerance to activity but continues to be significant challenge by SLS balance. Patient would benefit from continued PT.      Plan: Continue per plan of care.  Progress treatment as tolerated.       Visit/Unit Tracking  AUTH Status:  Date 2025   Ins Type: CMS Used 1 2 3 4 5   Auth Req: No         PT/OT Limit: BOMN Remaining            Pertinent Findings:      POC End Date: 25                                                                                          Test / Measure  2025   FOTO (Predicted 71) 55   LLTT  (+)   SLR/Slump (-)   R ankle DF  5*   SLS Balance  L: 8\"  R: 2\"     Precautions: Fall risk      Manuals 2025                                                            Neuro Re-Ed                          Nerve glides  2x20 B  2x20 B 2x20 B 2x20 B        SLS   10x red 1 kg ball toss 10x red 1 kg ball toss 10x red 1 kg ball toss 10x red 1 kg ball toss        Heel raise   30x  30x  30x         squat   2x10 airex on chair  3x10 airex on chair  3x10 airex on chair                                   Ther Ex                          Self CKC DF mob 3way   X10 ea 3 way 20x ea  3 way 20x ea  3 way 20x ea  3 way 20x ea       " "  Gastroc stretch on FR 6x20\" 10x10\" ea 10x10\"  10x10\"  10x10\"         Rb - activity tolerance   5'  8' 8' 8'        Standing lumbar ext   2x10                       Edu 5'            Ther Activity                                       Gait Training                                       Modalities                                                  "

## 2025-06-11 ENCOUNTER — CONSULT (OUTPATIENT)
Dept: PAIN MEDICINE | Facility: CLINIC | Age: 60
End: 2025-06-11
Payer: COMMERCIAL

## 2025-06-11 ENCOUNTER — OFFICE VISIT (OUTPATIENT)
Age: 60
End: 2025-06-11
Attending: STUDENT IN AN ORGANIZED HEALTH CARE EDUCATION/TRAINING PROGRAM
Payer: COMMERCIAL

## 2025-06-11 ENCOUNTER — HOSPITAL ENCOUNTER (OUTPATIENT)
Dept: RADIOLOGY | Facility: CLINIC | Age: 60
Discharge: HOME | End: 2025-06-11
Attending: ANESTHESIOLOGY
Payer: COMMERCIAL

## 2025-06-11 VITALS — WEIGHT: 178.8 LBS | HEIGHT: 62 IN | BODY MASS INDEX: 32.9 KG/M2

## 2025-06-11 DIAGNOSIS — M47.26 OTHER SPONDYLOSIS WITH RADICULOPATHY, LUMBAR REGION: ICD-10-CM

## 2025-06-11 DIAGNOSIS — M79.671 RIGHT FOOT PAIN: Primary | ICD-10-CM

## 2025-06-11 DIAGNOSIS — M47.26 OTHER SPONDYLOSIS WITH RADICULOPATHY, LUMBAR REGION: Primary | ICD-10-CM

## 2025-06-11 DIAGNOSIS — Q66.01 TALIPES EQUINOVARUS OF RIGHT LOWER EXTREMITY: ICD-10-CM

## 2025-06-11 PROCEDURE — 97112 NEUROMUSCULAR REEDUCATION: CPT | Performed by: PHYSICAL THERAPIST

## 2025-06-11 PROCEDURE — G2211 COMPLEX E/M VISIT ADD ON: HCPCS | Performed by: ANESTHESIOLOGY

## 2025-06-11 PROCEDURE — 72100 X-RAY EXAM L-S SPINE 2/3 VWS: CPT

## 2025-06-11 PROCEDURE — 97110 THERAPEUTIC EXERCISES: CPT | Performed by: PHYSICAL THERAPIST

## 2025-06-11 PROCEDURE — 99204 OFFICE O/P NEW MOD 45 MIN: CPT | Performed by: ANESTHESIOLOGY

## 2025-06-11 NOTE — PROGRESS NOTES
"Daily Note     Today's date: 2025  Patient name: Margaret Shepard  : 1965  MRN: [id number]  Referring provider: Natasha Geiger DPM  Dx:   Encounter Diagnosis     ICD-10-CM    1. Right foot pain  M79.671       2. Talipes equinovarus of right lower extremity  Q66.01           Start Time: 1645  Stop Time: 1730  Total time in clinic (min): 45 minutes    Subjective: Reports pain in foot/ankle is great. Physician adding script for lumbar.       Objective: See treatment diary below      Assessment: Tolerated treatment well. Pt nearing max potential for ankle. Doing very well. Will d/c ankle next session and eval the spine. Progressions with LE strength with good tolerance. Patient overall improving with tolerance to activity but continues to be significant challenge by SLS balance. Patient would benefit from continued PT.      Plan: Continue per plan of care.  Progress treatment as tolerated.       Visit/Unit Tracking  AUTH Status:  Date 2025   Ins Type: CMS Used 1 2 3 4 5 6   Auth Req: No          PT/OT Limit: BOMN Remaining             Pertinent Findings:      POC End Date: 25                                                                                          Test / Measure  2025   FOTO (Predicted 71) 55   LLTT  (+)   SLR/Slump (-)   R ankle DF  5*   SLS Balance  L: 8\"  R: 2\"     Precautions: Fall risk      Manuals 2025                                                           Neuro Re-Ed                          Nerve glides  2x20 B  2x20 B 2x20 B 2x20 B 2x20 B       SLS   10x red 1 kg ball toss 10x red 1 kg ball toss 10x red 1 kg ball toss 10x red 1 kg ball toss 10x red 1 kg ball toss       Heel raise   30x  30x  30x  30x        squat   2x10 airex on chair  3x10 airex on chair  3x10 airex on chair  S pad  3x10        FSU and over      8in 2x10 (B)                    Ther Ex                          Self CKC DF mob 3way   X10 ea 3 way 20x " "ea  3 way 20x ea  3 way 20x ea  3 way 20x ea  3 way 20x ea        Gastroc stretch on FR 6x20\" 10x10\" ea 10x10\"  10x10\"  10x10\"  10x10\"        Rb - activity tolerance   5'  8' 8' 8' 8'       Standing lumbar ext   2x10                       Edu 5'            Ther Activity                                       Gait Training                                       Modalities                                                  "

## 2025-06-11 NOTE — PATIENT INSTRUCTIONS
"Patient Education     Back exercises   The Basics   Written by the doctors and editors at [facility]   Why do I need to do back exercises? -- Back exercises can help ease back pain and might help prevent future back pain. Long term, it is important to strengthen the muscles in your lower back, buttocks, and belly. These are your \"core muscles.\" Stretching exercises are also important to keep your muscles flexible.  Below are some stretching and strengthening exercises that might help you. Other forms of movement can help ease or prevent back pain, too. For example, some people like to walk, do aerobic exercise, or do yoga or silvino chi. The most important thing is to move your body. Your doctor, nurse, or physical therapist can help you find different types of activity that work for you.  What stretching exercises should I do? -- Below are some examples of stretching exercises. Warm up your muscles before stretching to help prevent injury. To warm up, you can walk, jog, or cycle for a few minutes.  Start by repeating each of these stretches 2 to 3 times. Try to hold each stretch for 5 to 10 seconds, and try to do the stretches 2 to 3 times each day. Breathe slowly and deeply as you do the exercises. Never bounce when doing stretches.   Cat-cow stretch (figure 1) - Start on all fours on the floor, with your hands under your shoulders, knees under your hips, and your back flat. First, tuck your chin and tighten your stomach muscles as you round your back (like a \"cat\"). Hold the stretch for about 10 seconds. Rest for a few seconds as you flatten your back. Next, lift your chin and let your belly and lower back sink toward the floor (like a \"cow\"). Hold the stretch for about 10 seconds.   Single knee-to-chest stretches (figure 2) ? While lying on your back, bend your knees with your feet flat on the floor. Pull 1 knee toward your chest until you feel a stretch in your lower back and buttock area. Lower, and repeat with the " other knee. If you have knee problems, pull your knee up by grabbing the back of your thigh instead of the front of your knee. You can also do this exercise by grabbing both knees at the same time.   Lower trunk rotations (figure 3) ? While lying on your back, bend your knees with your feet flat on the floor. Keep your knees and ankles together, and then drop them to 1 side. Keep both of your shoulders touching the floor until you feel a stretch in the muscles at the side of the back. Repeat on the other side.   Lower back stretches seated (figure 4) ? Sit in a chair with your feet spread about shoulder width apart. Then, lean forward until you feel a stretch in your lower back.  What strengthening exercises should I do? -- Below are some examples of strengthening exercises.  Start by doing each exercise 2 to 3 times. Work up to doing each exercise 10 times. Hold each exercise for 3 to 5 seconds, and try to do the exercises 2 to 3 times each day. Do all exercises slowly.   Shoulder blade squeezes (figure 5) ? Pinch your shoulder blades together on your upper back, and hold 3 to 5 seconds. You can also do these 1 side at a time. Sit with good posture, and make sure that your shoulders do not rise up when you do this exercise. Relax.   Pelvic tilts (figure 6) ? Lie on your back with your knees bent and feet flat on the floor. Tighten your stomach muscles, and press your lower back down to the floor. Relax. You should be able to breathe comfortably during this exercise.   Hip lifts (figure 7) ? Lie on your back with your knees bent and feet flat on the floor. Tighten your stomach muscles, keep your back flat, and lift your buttocks off of the floor. Relax. You should feel this in your buttocks, not in your lower back.  What else should I know?    Exercise, including stretching, might be slightly uncomfortable. But you should not have sharp or severe pain. If you do get severe pain, stop what you are doing. If severe  "pain continues, call your doctor or nurse.   Do not hold your breath when exercising. If you tend to hold your breath, try counting out loud when exercising. If any exercise bothers you, stop right away.   Always warm up before exercising. Warm muscles stretch much easier than cool muscles. Stretching cool muscles can lead to injury.   Doing exercises before a meal can be a good way to get into a routine.  All topics are updated as new evidence becomes available and our peer review process is complete.  This topic retrieved from [facility] on: Apr 03, 2024.  Topic 918693 Version 2.0  Release: 32.2.4 - C32.92  © 2024 UpToDate, Inc. and/or its affiliates. All rights reserved.  figure 1: Cat-cow stretch     Start on all fours on the floor, with hands under your shoulders, knees under your hips, and your back flat. First, tuck your chin and tighten your stomach muscles as you round your back (like a \"cat\"). Hold the stretch for about 10 seconds. Rest for a few seconds as you flatten your back. Next, lift your chin and let your belly and lower back sink toward the floor (like a \"cow\"). Hold the stretch for about 10 seconds.  Graphic 478278 Version 1.0  figure 2: Single knee-to-chest stretch     Lie on your back, bend your knees, and have your feet flat on the floor. Pull 1 knee toward your chest until you feel a stretch in your lower back and buttock area. Repeat with the other knee. If you have knee problems, pull your knee up by grabbing the back of your thigh instead of the front of your knee. You can also do this exercise by grabbing both knees at the same time.  Graphic [id number] Version 1.0  figure 3: Lower trunk rotation     While lying on your back, bend your knees and have your feet flat on the floor. Keep your legs together and then drop them to 1 side. Keep both of your shoulders touching the floor until you feel a stretch in the muscles at the side of the back. Repeat on the other side.  Graphic [id number] Version " 1.0  figure 4: Lower back stretch     Sit in a chair with your feet spread about shoulder width apart. Then, lean forward until you feel a stretch in your lower back.  Graphic [id number] Version 1.0  figure 5: Shoulder blade squeezes     Pinch your shoulder blades together on your upper back and hold for 3 to 5 seconds. Make sure that you are sitting with good posture and that your shoulders do not raise up when you do this exercise. Relax.  Graphic 950865 Version 1.0  figure 6: Pelvic tilts     Lie on your back with your knees bent and feet flat on the floor. Tighten your stomach muscles and press your lower back down to the floor. Relax.  Graphic 707406 Version 1.0  figure 7: Hip lifts     Lie on your back with your knees bent and feet flat on the floor. Tighten your stomach muscles and lift your buttocks off of the floor. Relax.  Graphic [id number] Version 1.0  Consumer Information Use and Disclaimer   Disclaimer: This generalized information is a limited summary of diagnosis, treatment, and/or medication information. It is not meant to be comprehensive and should be used as a tool to help the user understand and/or assess potential diagnostic and treatment options. It does NOT include all information about conditions, treatments, medications, side effects, or risks that may apply to a specific patient. It is not intended to be medical advice or a substitute for the medical advice, diagnosis, or treatment of a health care provider based on the health care provider's examination and assessment of a patient's specific and unique circumstances. Patients must speak with a health care provider for complete information about their health, medical questions, and treatment options, including any risks or benefits regarding use of medications. This information does not endorse any treatments or medications as safe, effective, or approved for treating a specific patient. Up[employer], Inc[employer] and its affiliates disclaim any warranty or  liability relating to this information or the use thereof.The use of this information is governed by the Terms of Use, available at https://www.wolterskluwer.com/en/know/clinical-effectiveness-terms. 2024© UpToDate, Inc. and its affiliates and/or licensors. All rights reserved.  Copyright   © 2024 [employer] and/or its affiliates. All rights reserved.

## 2025-06-12 ENCOUNTER — RESULTS FOLLOW-UP (OUTPATIENT)
Dept: PAIN MEDICINE | Facility: CLINIC | Age: 60
End: 2025-06-12

## 2025-06-12 NOTE — PROGRESS NOTES
Name: Margaret Shepard      : 1965      MRN: [id number]  Encounter Provider: Mj Linn MD  Encounter Date: 2025   [facility] [place]  :  Assessment & Plan  Other spondylosis with radiculopathy, lumbar region    Orders:    XR spine lumbar 2 or 3 views injury; Future    MRI lumbar spine wo contrast; Future    Ambulatory referral to Physical Therapy; Future      -xray, MRI lumbar spine; will f/u result  -lyrica 150 mg t.i.d. Ordered for patient; counseled regarding sedative effects of taking this medication and provided up titration calendar.  Counseled not to take medication while driving or operating heavy machinery/using stairs  -continue formal physical therapy for lumbar radiculopathy; Physician directed home exercise plan as per [employer] demonstrated and handouts provided that patient plans to participate with for 1 hour, twice a week for the next 6 weeks.     Right-sided lumbar radicular pain in the L5 dermatomal distribution accompanied by pain limited weakness numbness and paresthesias.  Patient has not yet participated with PT. Subacute pain with decreased participation with IADLs over the past few months.  Has been taking NSAIDs and tylenol in addition to gabapentin and Robaxin with modest benefit.  5/5 strength bilaterally, positive SLR left-sided. Reflexes 2+.  Additionally there is positive facet loading, right, greater than left. Denies any gait instability, saddle anesthesia. No pertinent imaging available to review at this time.  Risks, benefits alternatives epidural steroid injections thoroughly discussed with patient.  Handouts provided questions answered to patient's satisfaction.  Lifestyle modifications extensively discussed including diet, exercise and weight loss in addition to core strengthening.  Will proceed with multimodal pain therapy plan as noted below:    Complete risks and benefits including bleeding, infection, tissue  reaction, nerve injury and allergic reaction were discussed. The approach was demonstrated using models and literature was provided. Verbal and written consent was obtained.    My impressions and treatment recommendations were discussed in detail with the patient who verbalized understanding and had no further questions.  Discharge instructions were provided. I personally saw and examined the patient and I agree with the above discussed plan of care.    History of Present Illness     Margaret Shepard is a 60 y.o. female with pmhx of obesity, GERD, presenting with presenting with subacute lumbar radicular pain in the right L5 dermatomal distribution. Debilitating pain limited weakness numbness and paresthesias accompany the pain. The patient rates the pain at a 8/10 accompanied by electric shock-like shooting features and crampy burning pain in the aforementioned dermatomal distribution.  The pain is worse in the mornings as well as the end of the day; exertion such as walking for long periods of time seems to exacerbate the pain.  The patient can hardly walk more than a few blocks without having debilitating pain.  She tries to maintain an active lifestyle and finds that the current degree of pain seems to compromises her efforts.  The pain significantly impacts independent activities of daily living and contributes to significant disability. She has attempted physical therapy with modest relief of the pain.  She has taken nsaids, tylenol with limited relief of the pain as well. She has never tried epidural steroid injections in the past. She denies any bowel or bladder dysfunction/incontinence, saddle anesthesia or gait instability.    Review of Systems   Constitutional:  Positive for activity change.   HENT: Negative.     Eyes: Negative.    Respiratory: Negative.     Cardiovascular: Negative.    Gastrointestinal: Negative.    Endocrine: Negative.    Genitourinary: Negative.    Musculoskeletal:  Positive for arthralgias,  "back pain, gait problem and myalgias.   Skin: Negative.    Allergic/Immunologic: Negative.    Neurological:  Positive for weakness and numbness.   Hematological: Negative.    Psychiatric/Behavioral: Negative.     All other systems reviewed and are negative.    Medical History Reviewed by provider this encounter:  Tobacco  Allergies  Meds  Problems  Med Hx  Surg Hx  Fam Hx     .     Objective   Ht 5' 2\" (1.575 m)   Wt 81.1 kg (178 lb 12.8 oz)   BMI 32.70 kg/m²         Physical Exam    Constitutional: normal, well developed, well nourished, alert, in no distress and non-toxic and no overt pain behavior. obese  Eyes: anicteric  HEENT: grossly intact  Neck: supple, symmetric, trachea midline and no masses   Pulmonary:even and unlabored  Cardiovascular:No edema or pitting edema present  Skin:Normal without rashes or lesions and well hydrated  Psychiatric:Mood and affect appropriate  Neurologic:Cranial Nerves II-XII grossly intact Sensation grossly intact; no clonus negative cantrell's. Reflexes 2+ and brisk. SLR positive left sided  Musculoskeletal:normal gait. 5/5 strength bilaterally with AROM in lower extremities. Difficulty with normal heel toe and tip toe walking. Significant pain with lumbar facet loading bilaterally and with lateral spine rotation, ttp over lumbar paraspinal muscles, R>L. Negative annemarie's test, negative gaenslen's negative SIJ loading bilaterally.    Administrative Statements   I have spent a total time of 30 minutes in caring for this patient on the day of the visit/encounter including Diagnostic results, Prognosis, Risks and benefits of tx options, Instructions for management, Patient and family education, Importance of tx compliance, Risk factor reductions, Impressions, Counseling / Coordination of care, Documenting in the medical record, Reviewing/placing orders in the medical record (including tests, medications, and/or procedures), Obtaining or reviewing history  , and Communicating " with other healthcare professionals .

## 2025-06-26 ENCOUNTER — HOSPITAL ENCOUNTER (OUTPATIENT)
Dept: MRI IMAGING | Facility: HOSPITAL | Age: 60
End: 2025-06-26
Attending: ANESTHESIOLOGY
Payer: COMMERCIAL

## 2025-06-26 DIAGNOSIS — M47.26 OTHER SPONDYLOSIS WITH RADICULOPATHY, LUMBAR REGION: ICD-10-CM

## 2025-06-26 PROCEDURE — 72148 MRI LUMBAR SPINE W/O DYE: CPT

## 2025-07-01 RX ORDER — QUETIAPINE FUMARATE 100 MG/1
100 TABLET, FILM COATED ORAL
COMMUNITY

## 2025-07-01 RX ORDER — PAROXETINE 10 MG/1
10 TABLET, FILM COATED ORAL
COMMUNITY

## 2025-07-01 RX ORDER — NICOTINE 21 MG/24HR
1 PATCH, TRANSDERMAL 24 HOURS TRANSDERMAL EVERY 24 HOURS
COMMUNITY

## 2025-07-02 ENCOUNTER — OFFICE VISIT (OUTPATIENT)
Dept: PODIATRY | Age: 60
End: 2025-07-02
Payer: COMMERCIAL

## 2025-07-02 VITALS — BODY MASS INDEX: 32.66 KG/M2 | HEIGHT: 62 IN | WEIGHT: 177.5 LBS

## 2025-07-02 DIAGNOSIS — M79.671 PAIN IN BOTH FEET: ICD-10-CM

## 2025-07-02 DIAGNOSIS — M54.50 LOW BACK PAIN, UNSPECIFIED BACK PAIN LATERALITY, UNSPECIFIED CHRONICITY, UNSPECIFIED WHETHER SCIATICA PRESENT: ICD-10-CM

## 2025-07-02 DIAGNOSIS — M79.672 PAIN IN BOTH FEET: ICD-10-CM

## 2025-07-02 DIAGNOSIS — M76.71 PERONEAL TENDINITIS OF RIGHT LOWER EXTREMITY: Primary | ICD-10-CM

## 2025-07-02 DIAGNOSIS — I83.893 VARICOSE VEINS OF LEG WITH SWELLING, BILATERAL: ICD-10-CM

## 2025-07-02 PROCEDURE — 99213 OFFICE O/P EST LOW 20 MIN: CPT | Performed by: STUDENT IN AN ORGANIZED HEALTH CARE EDUCATION/TRAINING PROGRAM

## 2025-07-02 NOTE — PATIENT INSTRUCTIONS
Foot Pain Home Therapy    Stretch. Place painful foot in back with heel on ground and lean against wall. Do not lift heel off of ground. You will feel the stretch in the calf muscles and possibly the heel. Hold the stretch for 20-30 seconds. Do this at least 5-6 times per day. It is best done after exercise when your muscles are warm.  Wear supportive shoes at all times. Avoid flip-flops, flat sandals, barefoot walking (never walk barefoot, even at home). Generally avoid shoes that are too flexible and bend in the arch. Your shoes should only slightly bend in the toe area, not the middle. Running sneakers are often the best choice.  Supportive sneaker brands: Erickson, On Cloud, Hoka, Altra, New Balance, Asics, Mizuno  [employer] (discount if mention Dr benz)  Fleet Feet [place]  [facility]   Curoverse [place]  Supportive daily shoe brands: Vionic, Orthofeet, Debra, Dansko, Chacos, Stahl, Teva, Birkenstock  Supportive home shoes: Cody Ambrose (recovery slides)  Purchase over the counter topical pain creams such as Voltarin gel, biofreeze, or CBD cream - will need to apply 2-3 times per day for benefit. + deep tissue massage.   Look in to over the counter shoe inserts/arch supports such as Superfeet, Powerstep arch supports. These are all available on [employer].com as well as their individual website's.       Achilles Tendon Stretching Exercises    A) Standing Gastrocnemius stretch  Place hands on wall or chair. If using wall, put your hands at eye level.  Step the leg you want to stretch behind you. Keep your back heel on the floor and point your toes straight ahead or slightly inward towards the heel of the opposite foot.   Bend your knee toward the wall while keeping your back leg straight.  Lean toward the wall until you feel a gentle stretch in you calf of the straight leg. Don't lean so far that you feel pain.  Hold for 15 seconds. Complete 3 reps.    B) Standing soleus  stretch  Place your hands on the wall or chair. If using wall, put your hands at eye level.  Step the leg you want to stretch behind you (your back foot will need to be closer to the front foot than the above stretch). Keep your back heel on the floor and point your toes straight ahead or slightly inward towards the heel of the opposite foot.   Bend both your front and back knee at the same time (may help to stick your butt out). You do not need to lean towards the wall, just bend the knees.   Lean toward the wall until you feel a gentle stretch in you calf of the straight leg. Don't lean so far that you feel pain.  Hold for 15 seconds. Complete 3 reps.          Keep these tips and tricks in mind to get the most out of your stretching;  Take your time - move slowly, whether you are deepening into a stretch or changing positions. This will limit the risk of injury & discomfort.  Avoid bouncing - quick sudden movements will only worsen achilles tendon issues. Stay relaxed during stretch.  Keep your heel down and toes straight ahead or slightly inward - this will allow the achilles tendon to stretch properly.   Stop if you feel pain - Don't strain or force your muscle. If you feel sharp pain, stop immediately.

## 2025-07-02 NOTE — ASSESSMENT & PLAN NOTE
- R>L foot pain has resolved (ddx plantar fasciitis, Zaragoza's nerve entrapment, insertional peroneal tendonitis R>L, sinus tarsi syndrome R, stress fracture, polyneuropathy, L5 and S1 nerve root compression, neurogenic intermittent claudication, venous insufficiency, degenerative changes (calcaneal spurs, arthrosis of the joints of the foot), and inflammatory conditions of the ligaments and fascia of the foot and ankle)   - see lumbar MRI as below, see spine/pain mngmt note   - f/u EMG RLE if pain returns  - prn Lace up ankle brace right foot if pain returns  - c/w Shoegear and arch support recs given  - PT notes reviewed   - RTC prn

## 2025-07-02 NOTE — PROGRESS NOTES
"Name: Margaret Shepard      : 1965      MRN: [id number]  Encounter Provider: Natasha Geiger DPM  Encounter Date: 2025   Encounter department: [facility] [place]  :  Assessment & Plan  Peroneal tendinitis of right lower extremity  Pain in both feet  - R>L foot pain has resolved (ddx plantar fasciitis, Zaragoza's nerve entrapment, insertional peroneal tendonitis R>L, sinus tarsi syndrome R, stress fracture, polyneuropathy, L5 and S1 nerve root compression, neurogenic intermittent claudication, venous insufficiency, degenerative changes (calcaneal spurs, arthrosis of the joints of the foot), and inflammatory conditions of the ligaments and fascia of the foot and ankle)   - see lumbar MRI as below, see spine/pain mngmt note   - f/u EMG RLE if pain returns  - prn Lace up ankle brace right foot if pain returns  - c/w Shoegear and arch support recs given  - PT notes reviewed   - RTC prn         Low back pain, unspecified back pain laterality, unspecified chronicity, unspecified whether sciatica present  - Spine/pain notes reviewed; started on lyrica  - MRI lower back 25: \"Disc bulges are noted at the L4-5 and L5-S1 levels with superimposed disc protrusions. Mild central canal narrowing is noted at the L5-S1 level.\"   - F/u for further care       Varicose veins of leg with swelling, bilateral  - Venous reflux study 25 reviewed; no evidence of venous incompetence.   - We again discussed at length lifestyle modifications for LE edema. We recommend to not sleep in a recliner. To avoid periods of sitting with legs in a dependent position. To elevate legs and lay flat for periods in the day and at night. To take diuretics as directed. Also recommend weight loss, increased ambulation, increased activity, and monitor diet especially sodium intake. To use compression stockings.     - Smoking cessation recommended       Prior Imaging   XR right and left foot WB 6v 25: No acute osseous " "abnormalities noted. Small plantar calcaneal spurs.  Accessory peroneal bone B/L     History of Present Illness   HPI  Margaret Shepard is a 60 y.o. female who presents to clinic for f/u B/L foot pain. Notes pain has resolved to B/L feet and swelling greatly improved. Went to PT, got venous reflux study and saw pain mngmt. Brace and meloxicam helped.      Initial HPI \"B/L foot pain and swelling present for 4 months, presented 1wk after a fall.   Notes pain to outside of right foot, plantar heel and dorsal foot burning. Pain when she puts pressure on site. No issues prior.  Notes swelling to both feet, ankles and tops of feet. Worse at end of day.   + lower back pain extending to buttock and thighs.  No calf pain.    Went to ED 4/18/25 due to this.   PMH sig for HIV, prediabetes, HLD, mild obesity. Current smoker.\"       Review of Systems   Constitutional:  Negative for activity change, chills and fever.   HENT: Negative.     Respiratory:  Negative for cough, chest tightness and shortness of breath.    Cardiovascular:  Negative for chest pain and leg swelling.   Endocrine: Negative.    Genitourinary: Negative.    Musculoskeletal:  Positive for back pain. Negative for gait problem.   Neurological:  Negative for numbness.   Psychiatric/Behavioral: Negative.  Negative for agitation and behavioral problems.           Objective   Ht 5' 2\" (1.575 m)   Wt 80.5 kg (177 lb 8 oz)   BMI 32.47 kg/m²      Physical Exam  Constitutional:       General: She is not in acute distress.     Appearance: Normal appearance. She is obese. She is not ill-appearing.     Cardiovascular:      Pulses: Normal pulses.      Comments: Mild B/L LE edema and varicosities. Pedal pulses palpable. Toes warm  Pulmonary:      Effort: Pulmonary effort is normal.     Musculoskeletal:         General: No tenderness (Right heel at insertion of plantar fascia to calcaneus, global medial/lateral heel, sinus tarsi resolved. R>L 5th met base resolved).      Comments: " MMT 5/5 in all planes. Toe and ankle ROM intact and without pain.   Gastroc-soleal equinus.      Skin:     General: Skin is warm and dry.      Findings: No erythema or lesion.     Neurological:      General: No focal deficit present.      Mental Status: She is alert and oriented to person, place, and time.      Sensory: No sensory deficit.      Comments: + intermittent burning right dorsal foot resolved